# Patient Record
Sex: MALE | Race: ASIAN | NOT HISPANIC OR LATINO
[De-identification: names, ages, dates, MRNs, and addresses within clinical notes are randomized per-mention and may not be internally consistent; named-entity substitution may affect disease eponyms.]

---

## 2022-09-07 ENCOUNTER — NON-APPOINTMENT (OUTPATIENT)
Age: 72
End: 2022-09-07

## 2022-09-12 PROBLEM — Z00.00 ENCOUNTER FOR PREVENTIVE HEALTH EXAMINATION: Status: ACTIVE | Noted: 2022-09-12

## 2022-09-15 ENCOUNTER — NON-APPOINTMENT (OUTPATIENT)
Age: 72
End: 2022-09-15

## 2022-09-15 ENCOUNTER — APPOINTMENT (OUTPATIENT)
Dept: OPHTHALMOLOGY | Facility: CLINIC | Age: 72
End: 2022-09-15

## 2022-09-15 PROCEDURE — 92004 COMPRE OPH EXAM NEW PT 1/>: CPT

## 2022-09-22 ENCOUNTER — NON-APPOINTMENT (OUTPATIENT)
Age: 72
End: 2022-09-22

## 2022-09-22 ENCOUNTER — APPOINTMENT (OUTPATIENT)
Dept: OPHTHALMOLOGY | Facility: CLINIC | Age: 72
End: 2022-09-22

## 2022-09-22 PROCEDURE — 92012 INTRM OPH EXAM EST PATIENT: CPT

## 2022-09-22 PROCEDURE — 92136 OPHTHALMIC BIOMETRY: CPT

## 2022-10-05 ENCOUNTER — APPOINTMENT (OUTPATIENT)
Dept: OPHTHALMOLOGY | Facility: EYE CENTER | Age: 72
End: 2022-10-05

## 2022-10-06 ENCOUNTER — APPOINTMENT (OUTPATIENT)
Dept: OPHTHALMOLOGY | Facility: CLINIC | Age: 72
End: 2022-10-06

## 2023-03-04 ENCOUNTER — INPATIENT (INPATIENT)
Facility: HOSPITAL | Age: 73
LOS: 1 days | Discharge: HOME | DRG: 69 | End: 2023-03-06
Attending: INTERNAL MEDICINE | Admitting: INTERNAL MEDICINE
Payer: COMMERCIAL

## 2023-03-04 VITALS
RESPIRATION RATE: 17 BRPM | HEIGHT: 68 IN | TEMPERATURE: 98 F | SYSTOLIC BLOOD PRESSURE: 162 MMHG | OXYGEN SATURATION: 96 % | DIASTOLIC BLOOD PRESSURE: 90 MMHG | HEART RATE: 64 BPM | WEIGHT: 173.94 LBS

## 2023-03-04 DIAGNOSIS — Z29.9 ENCOUNTER FOR PROPHYLACTIC MEASURES, UNSPECIFIED: ICD-10-CM

## 2023-03-04 DIAGNOSIS — E11.9 TYPE 2 DIABETES MELLITUS WITHOUT COMPLICATIONS: ICD-10-CM

## 2023-03-04 DIAGNOSIS — G45.9 TRANSIENT CEREBRAL ISCHEMIC ATTACK, UNSPECIFIED: ICD-10-CM

## 2023-03-04 LAB
ALBUMIN SERPL ELPH-MCNC: 3.7 G/DL — SIGNIFICANT CHANGE UP (ref 3.5–5)
ALP SERPL-CCNC: 97 U/L — SIGNIFICANT CHANGE UP (ref 40–120)
ALT FLD-CCNC: 21 U/L DA — SIGNIFICANT CHANGE UP (ref 10–60)
ANION GAP SERPL CALC-SCNC: 0 MMOL/L — LOW (ref 5–17)
ANISOCYTOSIS BLD QL: SLIGHT — SIGNIFICANT CHANGE UP
APTT BLD: 30 SEC — SIGNIFICANT CHANGE UP (ref 27.5–35.5)
AST SERPL-CCNC: 35 U/L — SIGNIFICANT CHANGE UP (ref 10–40)
BASOPHILS # BLD AUTO: 0.02 K/UL — SIGNIFICANT CHANGE UP (ref 0–0.2)
BASOPHILS NFR BLD AUTO: 0.3 % — SIGNIFICANT CHANGE UP (ref 0–2)
BILIRUB SERPL-MCNC: 0.9 MG/DL — SIGNIFICANT CHANGE UP (ref 0.2–1.2)
BUN SERPL-MCNC: 9 MG/DL — SIGNIFICANT CHANGE UP (ref 7–18)
CALCIUM SERPL-MCNC: 9.7 MG/DL — SIGNIFICANT CHANGE UP (ref 8.4–10.5)
CHLORIDE SERPL-SCNC: 103 MMOL/L — SIGNIFICANT CHANGE UP (ref 96–108)
CO2 SERPL-SCNC: 31 MMOL/L — SIGNIFICANT CHANGE UP (ref 22–31)
CREAT SERPL-MCNC: 0.74 MG/DL — SIGNIFICANT CHANGE UP (ref 0.5–1.3)
EGFR: 97 ML/MIN/1.73M2 — SIGNIFICANT CHANGE UP
EOSINOPHIL # BLD AUTO: 0.32 K/UL — SIGNIFICANT CHANGE UP (ref 0–0.5)
EOSINOPHIL NFR BLD AUTO: 4.4 % — SIGNIFICANT CHANGE UP (ref 0–6)
GLUCOSE BLDC GLUCOMTR-MCNC: 156 MG/DL — HIGH (ref 70–99)
GLUCOSE SERPL-MCNC: 110 MG/DL — HIGH (ref 70–99)
HCT VFR BLD CALC: 44.1 % — SIGNIFICANT CHANGE UP (ref 39–50)
HGB BLD-MCNC: 13.7 G/DL — SIGNIFICANT CHANGE UP (ref 13–17)
HYPOCHROMIA BLD QL: SLIGHT — SIGNIFICANT CHANGE UP
IMM GRANULOCYTES NFR BLD AUTO: 0.8 % — SIGNIFICANT CHANGE UP (ref 0–0.9)
INR BLD: 1.21 RATIO — HIGH (ref 0.88–1.16)
LYMPHOCYTES # BLD AUTO: 2.41 K/UL — SIGNIFICANT CHANGE UP (ref 1–3.3)
LYMPHOCYTES # BLD AUTO: 33 % — SIGNIFICANT CHANGE UP (ref 13–44)
MANUAL SMEAR VERIFICATION: SIGNIFICANT CHANGE UP
MCHC RBC-ENTMCNC: 20.5 PG — LOW (ref 27–34)
MCHC RBC-ENTMCNC: 31.1 GM/DL — LOW (ref 32–36)
MCV RBC AUTO: 66.1 FL — LOW (ref 80–100)
MICROCYTES BLD QL: SLIGHT — SIGNIFICANT CHANGE UP
MONOCYTES # BLD AUTO: 0.6 K/UL — SIGNIFICANT CHANGE UP (ref 0–0.9)
MONOCYTES NFR BLD AUTO: 8.2 % — SIGNIFICANT CHANGE UP (ref 2–14)
NEUTROPHILS # BLD AUTO: 3.89 K/UL — SIGNIFICANT CHANGE UP (ref 1.8–7.4)
NEUTROPHILS NFR BLD AUTO: 53.3 % — SIGNIFICANT CHANGE UP (ref 43–77)
NRBC # BLD: 0 /100 WBCS — SIGNIFICANT CHANGE UP (ref 0–0)
PLAT MORPH BLD: NORMAL — SIGNIFICANT CHANGE UP
PLATELET # BLD AUTO: 266 K/UL — SIGNIFICANT CHANGE UP (ref 150–400)
POTASSIUM SERPL-MCNC: 5.1 MMOL/L — SIGNIFICANT CHANGE UP (ref 3.5–5.3)
POTASSIUM SERPL-SCNC: 5.1 MMOL/L — SIGNIFICANT CHANGE UP (ref 3.5–5.3)
PROT SERPL-MCNC: 7.9 G/DL — SIGNIFICANT CHANGE UP (ref 6–8.3)
PROTHROM AB SERPL-ACNC: 14.4 SEC — HIGH (ref 10.5–13.4)
RBC # BLD: 6.67 M/UL — HIGH (ref 4.2–5.8)
RBC # FLD: 17.8 % — HIGH (ref 10.3–14.5)
RBC BLD AUTO: ABNORMAL
SARS-COV-2 RNA SPEC QL NAA+PROBE: SIGNIFICANT CHANGE UP
SODIUM SERPL-SCNC: 134 MMOL/L — LOW (ref 135–145)
SPHEROCYTES BLD QL SMEAR: SLIGHT — SIGNIFICANT CHANGE UP
TARGETS BLD QL SMEAR: SLIGHT — SIGNIFICANT CHANGE UP
TROPONIN I, HIGH SENSITIVITY RESULT: 4.8 NG/L — SIGNIFICANT CHANGE UP
WBC # BLD: 7.3 K/UL — SIGNIFICANT CHANGE UP (ref 3.8–10.5)
WBC # FLD AUTO: 7.3 K/UL — SIGNIFICANT CHANGE UP (ref 3.8–10.5)

## 2023-03-04 PROCEDURE — 99291 CRITICAL CARE FIRST HOUR: CPT

## 2023-03-04 PROCEDURE — 70498 CT ANGIOGRAPHY NECK: CPT | Mod: 26,MA

## 2023-03-04 PROCEDURE — 70496 CT ANGIOGRAPHY HEAD: CPT | Mod: 26,MA

## 2023-03-04 PROCEDURE — 0042T: CPT | Mod: MA

## 2023-03-04 PROCEDURE — 93010 ELECTROCARDIOGRAM REPORT: CPT

## 2023-03-04 RX ORDER — ATORVASTATIN CALCIUM 80 MG/1
80 TABLET, FILM COATED ORAL AT BEDTIME
Refills: 0 | Status: DISCONTINUED | OUTPATIENT
Start: 2023-03-04 | End: 2023-03-06

## 2023-03-04 RX ORDER — ENOXAPARIN SODIUM 100 MG/ML
40 INJECTION SUBCUTANEOUS EVERY 24 HOURS
Refills: 0 | Status: DISCONTINUED | OUTPATIENT
Start: 2023-03-04 | End: 2023-03-06

## 2023-03-04 RX ORDER — ASPIRIN/CALCIUM CARB/MAGNESIUM 324 MG
81 TABLET ORAL DAILY
Refills: 0 | Status: DISCONTINUED | OUTPATIENT
Start: 2023-03-04 | End: 2023-03-06

## 2023-03-04 RX ORDER — ACETAMINOPHEN 500 MG
650 TABLET ORAL EVERY 6 HOURS
Refills: 0 | Status: DISCONTINUED | OUTPATIENT
Start: 2023-03-04 | End: 2023-03-06

## 2023-03-04 RX ORDER — INSULIN LISPRO 100/ML
VIAL (ML) SUBCUTANEOUS
Refills: 0 | Status: DISCONTINUED | OUTPATIENT
Start: 2023-03-04 | End: 2023-03-06

## 2023-03-04 RX ORDER — LOSARTAN POTASSIUM 100 MG/1
0 TABLET, FILM COATED ORAL
Qty: 0 | Refills: 0 | DISCHARGE

## 2023-03-04 RX ORDER — INSULIN LISPRO 100/ML
VIAL (ML) SUBCUTANEOUS AT BEDTIME
Refills: 0 | Status: DISCONTINUED | OUTPATIENT
Start: 2023-03-04 | End: 2023-03-06

## 2023-03-04 RX ORDER — ROSUVASTATIN CALCIUM 5 MG/1
0 TABLET ORAL
Qty: 0 | Refills: 0 | DISCHARGE

## 2023-03-04 RX ORDER — LANOLIN ALCOHOL/MO/W.PET/CERES
3 CREAM (GRAM) TOPICAL AT BEDTIME
Refills: 0 | Status: DISCONTINUED | OUTPATIENT
Start: 2023-03-04 | End: 2023-03-06

## 2023-03-04 RX ADMIN — ATORVASTATIN CALCIUM 80 MILLIGRAM(S): 80 TABLET, FILM COATED ORAL at 22:50

## 2023-03-04 NOTE — ED PROVIDER NOTE - INTERPRETATION
General Sunscreen Counseling: I recommended a broad spectrum sunscreen with a SPF of 30 or higher and the importance of sun protective clothing. Detail Level: Detailed abnormal

## 2023-03-04 NOTE — H&P ADULT - PROBLEM SELECTOR PLAN 1
pt p/w slurred speech, now resolved  NIHSS 0 on admission   CTH as above, no acute infarct   pt passed dysphagia screen in ED  admit to tele  started on aspirin and high dose statin  f/u A1c and lipid panel  f/u echo  Pt at baseline, does not need PT eval  Consider MRI per Neuro  Neuro Robbins consulted

## 2023-03-04 NOTE — ED ADULT NURSE NOTE - NSIMPLEMENTINTERV_GEN_ALL_ED
Implemented All Universal Safety Interventions:  [facility] to call system. Call bell, personal items and telephone within reach. Instruct patient to call for assistance. Room bathroom lighting operational. Non-slip footwear when patient is off stretcher. Physically safe environment: no spills, clutter or unnecessary equipment. Stretcher in lowest position, wheels locked, appropriate side rails in place.

## 2023-03-04 NOTE — ED ADULT NURSE NOTE - OBJECTIVE STATEMENT
As per pt. c/o slurred speech about 2 hrs ago which has since gotten better. Pt s/o of pulling feeling at the back of head. Denies pain and all other symptoms

## 2023-03-04 NOTE — ED PROVIDER NOTE - CLINICAL SUMMARY MEDICAL DECISION MAKING FREE TEXT BOX
72-year-old male hx of prior TIA, on ASA/Plavix, DM on metformin, presenting with slurred speech earlier today. NIHSS 0. CTH/CTA/perfusion normal. WIll admit to [facility] for stroke wrokup.

## 2023-03-04 NOTE — ED PROVIDER NOTE - OBJECTIVE STATEMENT
72-year-old male hx of prior TIA, on ASA/Plavix, DM on metformin, presenting with slurred speech earlier today. LKN 3pm. By the time he arrived in the ER his symptoms resolved. No focal weakness, numbness, tingling, difficulty walking, dizziness, or any other neurological symptoms. No other symptoms.

## 2023-03-04 NOTE — H&P ADULT - HISTORY OF PRESENT ILLNESS
72 year old male with PMHx TIA and DM presents with slurred speech since this afternoon. Pt states that he woke up more fatigued than usual this morning, however at 3pm, pt began slurring his speech. He states he was talking to his wife and noticed that it was hard for him to lift his tongue and pronounce certain letters. His wife told him that his face looked "swollen" but he had no facial droop at the time. Pt denies any other focal deficits at the time including trouble swallowing vision difficulty, or focal extremity weakness. He states that symptoms resolved at around 530pm. Pt denies any fall, head trauma or LOC. Also denies any recent fever/chills, headache, dizziness, chest pain, palpitations, cough, SOB, n/v/d/c, dysuria or leg swelling. Pt was on plavix for 40 days after his previous TIA and now takes aspirin only, metformin for DM. Was prescribed statin and losartan per surescripts but denies HTN/HLD or taking those meds. MEMO

## 2023-03-04 NOTE — H&P ADULT - NSHPPHYSICALEXAM_GEN_ALL_CORE
T(C): 36.4 (03-04-23 @ 18:45), Max: 36.8 (03-04-23 @ 17:42)  HR: 66 (03-04-23 @ 18:45) (64 - 66)  BP: 158/90 (03-04-23 @ 18:45) (158/90 - 162/90)  RR: 18 (03-04-23 @ 18:45) (17 - 18)  SpO2: 96% (03-04-23 @ 18:45) (96% - 96%)    GENERAL: patient appears well, NAD, pleasant  HEAD: normocephalic, atraumatic  EYES: sclera clear, no exudates  ENMT: oropharynx clear without erythema, no exudates, moist mucous membranes  NECK: supple, soft, no thyromegaly noted  LUNGS: clear to auscultation bilaterally, no wheezing, rhonchi or rales appreciated  HEART: S1/S2, regular rate and rhythm, no murmurs noted, no lower extremity edema  GASTROINTESTINAL: abdomen is soft, nondistended, nontender, normoactive bowel sounds, no palpable masses  INTEGUMENT: good skin turgor, no lesions noted  MUSCULOSKELETAL: no clubbing or cyanosis, no obvious deformity  NEUROLOGIC: AAO x3, CNII-XII intact, no obvious sensorimotor deficits noted

## 2023-03-05 LAB
A1C WITH ESTIMATED AVERAGE GLUCOSE RESULT: 6.7 % — HIGH (ref 4–5.6)
ANION GAP SERPL CALC-SCNC: 7 MMOL/L — SIGNIFICANT CHANGE UP (ref 5–17)
BUN SERPL-MCNC: 10 MG/DL — SIGNIFICANT CHANGE UP (ref 7–18)
CALCIUM SERPL-MCNC: 9.2 MG/DL — SIGNIFICANT CHANGE UP (ref 8.4–10.5)
CHLORIDE SERPL-SCNC: 105 MMOL/L — SIGNIFICANT CHANGE UP (ref 96–108)
CHOLEST SERPL-MCNC: 188 MG/DL — SIGNIFICANT CHANGE UP
CO2 SERPL-SCNC: 27 MMOL/L — SIGNIFICANT CHANGE UP (ref 22–31)
CREAT SERPL-MCNC: 0.71 MG/DL — SIGNIFICANT CHANGE UP (ref 0.5–1.3)
EGFR: 99 ML/MIN/1.73M2 — SIGNIFICANT CHANGE UP
ESTIMATED AVERAGE GLUCOSE: 146 MG/DL — HIGH (ref 68–114)
GLUCOSE BLDC GLUCOMTR-MCNC: 103 MG/DL — HIGH (ref 70–99)
GLUCOSE BLDC GLUCOMTR-MCNC: 119 MG/DL — HIGH (ref 70–99)
GLUCOSE BLDC GLUCOMTR-MCNC: 132 MG/DL — HIGH (ref 70–99)
GLUCOSE BLDC GLUCOMTR-MCNC: 174 MG/DL — HIGH (ref 70–99)
GLUCOSE SERPL-MCNC: 121 MG/DL — HIGH (ref 70–99)
HCT VFR BLD CALC: 39.7 % — SIGNIFICANT CHANGE UP (ref 39–50)
HCV AB S/CO SERPL IA: 0.06 S/CO — SIGNIFICANT CHANGE UP (ref 0–0.99)
HCV AB SERPL-IMP: SIGNIFICANT CHANGE UP
HDLC SERPL-MCNC: 29 MG/DL — LOW
HGB BLD-MCNC: 12.5 G/DL — LOW (ref 13–17)
LIPID PNL WITH DIRECT LDL SERPL: 88 MG/DL — SIGNIFICANT CHANGE UP
MAGNESIUM SERPL-MCNC: 1.9 MG/DL — SIGNIFICANT CHANGE UP (ref 1.6–2.6)
MCHC RBC-ENTMCNC: 20.4 PG — LOW (ref 27–34)
MCHC RBC-ENTMCNC: 31.5 GM/DL — LOW (ref 32–36)
MCV RBC AUTO: 64.7 FL — LOW (ref 80–100)
NON HDL CHOLESTEROL: 159 MG/DL — HIGH
NRBC # BLD: 0 /100 WBCS — SIGNIFICANT CHANGE UP (ref 0–0)
PHOSPHATE SERPL-MCNC: 2.3 MG/DL — LOW (ref 2.5–4.5)
PLATELET # BLD AUTO: 251 K/UL — SIGNIFICANT CHANGE UP (ref 150–400)
POTASSIUM SERPL-MCNC: 4.1 MMOL/L — SIGNIFICANT CHANGE UP (ref 3.5–5.3)
POTASSIUM SERPL-SCNC: 4.1 MMOL/L — SIGNIFICANT CHANGE UP (ref 3.5–5.3)
RBC # BLD: 6.14 M/UL — HIGH (ref 4.2–5.8)
RBC # FLD: 17.4 % — HIGH (ref 10.3–14.5)
SODIUM SERPL-SCNC: 139 MMOL/L — SIGNIFICANT CHANGE UP (ref 135–145)
TRIGL SERPL-MCNC: 355 MG/DL — HIGH
WBC # BLD: 8.71 K/UL — SIGNIFICANT CHANGE UP (ref 3.8–10.5)
WBC # FLD AUTO: 8.71 K/UL — SIGNIFICANT CHANGE UP (ref 3.8–10.5)

## 2023-03-05 PROCEDURE — 71046 X-RAY EXAM CHEST 2 VIEWS: CPT | Mod: 26

## 2023-03-05 PROCEDURE — 99223 1ST HOSP IP/OBS HIGH 75: CPT

## 2023-03-05 RX ORDER — ALBUTEROL 90 UG/1
2.5 AEROSOL, METERED ORAL EVERY 6 HOURS
Refills: 0 | Status: DISCONTINUED | OUTPATIENT
Start: 2023-03-05 | End: 2023-03-05

## 2023-03-05 RX ORDER — ALBUTEROL 90 UG/1
2 AEROSOL, METERED ORAL EVERY 6 HOURS
Refills: 0 | Status: DISCONTINUED | OUTPATIENT
Start: 2023-03-05 | End: 2023-03-06

## 2023-03-05 RX ADMIN — Medication 200 MILLIGRAM(S): at 18:53

## 2023-03-05 RX ADMIN — ALBUTEROL 2.5 MILLIGRAM(S): 90 AEROSOL, METERED ORAL at 14:54

## 2023-03-05 RX ADMIN — ENOXAPARIN SODIUM 40 MILLIGRAM(S): 100 INJECTION SUBCUTANEOUS at 05:56

## 2023-03-05 RX ADMIN — Medication 81 MILLIGRAM(S): at 12:09

## 2023-03-05 RX ADMIN — Medication 200 MILLIGRAM(S): at 15:31

## 2023-03-05 RX ADMIN — ALBUTEROL 2 PUFF(S): 90 AEROSOL, METERED ORAL at 22:07

## 2023-03-05 RX ADMIN — ATORVASTATIN CALCIUM 80 MILLIGRAM(S): 80 TABLET, FILM COATED ORAL at 22:08

## 2023-03-05 NOTE — CONSULT NOTE ADULT - SUBJECTIVE AND OBJECTIVE BOX
PATIENT SEEN AND EXAMINED ON :- 3/5/23  DATE OF SERVICE:   3/5/23          Interim events noted,Labs ,Radiological studies and Cardiology tests reviewed .    MR#[id number]  PATIENT NAME:-[facility] COURSE: HPI:  72 year old male with PMHx TIA and DM presents with slurred speech since this afternoon. Pt states that he woke up more fatigued than usual this morning, however at 3pm, pt began slurring his speech. He states he was talking to his wife and noticed that it was hard for him to lift his tongue and pronounce certain letters. His wife told him that his face looked "swollen" but he had no facial droop at the time. Pt denies any other focal deficits at the time including trouble swallowing vision difficulty, or focal extremity weakness. He states that symptoms resolved at around 530pm. Pt denies any fall, head trauma or LOC. Also denies any recent fever/chills, headache, dizziness, chest pain, palpitations, cough, SOB, n/v/d/c, dysuria or leg swelling. Pt was on plavix for 40 days after his previous TIA and now takes aspirin only, metformin for DM. Was prescribed statin and losartan per surescripts but denies HTN/HLD or taking those meds. NDKA  (04 Mar 2023 20:30)      INTERIM EVENTS:Patient seen at bedside ,interim events noted.      [facility] -reviewed admission note, no change since admission  HEART FAILURE: Acute[ ]Chronic[ ] Systolic[ ] Diastolic[ ] Combined Systolic and Diastolic[ ]  CAD[ ] CABG[ ] PCI[ ]  DEVICES[ ] PPM[ ] ICD[ ] ILR[ ]  ATRIAL FIBRILLATION[ ] Paroxysmal[ ] Permanent[ ] CHADS2-[  ]  ALEJANDRO[ ] CKD1[ ] CKD2[ ] CKD3[ ] CKD4[ ] ESRD[ ]  COPD[ ] HTN[ ]   DM[ ] Type1[ ] Type 2[ ]   CVA[ ] Paresis[ ]    AMBULATION: Assisted[ ] Cane/walker[ ] Independent[ ]    MEDICATIONS  (STANDING):  albuterol    90 MICROgram(s) HFA Inhaler 2 Puff(s) Inhalation every 6 hours  aspirin enteric coated 81 milliGRAM(s) Oral daily  atorvastatin 80 milliGRAM(s) Oral at bedtime  enoxaparin Injectable 40 milliGRAM(s) SubCutaneous every 24 hours  guaiFENesin Oral Liquid (Sugar-Free) 200 milliGRAM(s) Oral every 6 hours  insulin lispro (ADMELOG) corrective regimen sliding scale   SubCutaneous three times a day before meals  insulin lispro (ADMELOG) corrective regimen sliding scale   SubCutaneous at bedtime    MEDICATIONS  (PRN):  acetaminophen     Tablet .. 650 milliGRAM(s) Oral every 6 hours PRN Temp greater or equal to 38C (100.4F), Mild Pain (1 - 3)  melatonin 3 milliGRAM(s) Oral at bedtime PRN Insomnia            REVIEW OF SYSTEMS:  Constitutional: [ ] fever, [ ]weight loss,  [ ]fatigue [ ]weight gain  Eyes: [ ] visual changes  Respiratory: [ ]shortness of breath;  [ ] cough, [ ]wheezing, [ ]chills, [ ]hemoptysis  Cardiovascular: [ ] chest pain, [ ]palpitations, [ ]dizziness,  [ ]leg swelling[ ]orthopnea[ ]PND  Gastrointestinal: [ ] abdominal pain, [ ]nausea, [ ]vomiting,  [ ]diarrhea [ ]Constipation [ ]Melena  Genitourinary: [ ] dysuria, [ ] hematuria [ ]Wiley  Neurologic: [ ] headaches [ ] tremors[ ]weakness [ ]Paralysis Right[ ] Left[ ]  Skin: [ ] itching, [ ]burning, [ ] rashes  Endocrine: [ ] heat or cold intolerance  Musculoskeletal: [ ] joint pain or swelling; [ ] muscle, back, or extremity pain  Psychiatric: [ ] depression, [ ]anxiety, [ ]mood swings, or [ ]difficulty sleeping  Hematologic: [ ] easy bruising, [ ] bleeding gums    [ ] All remaining systems negative except as per above.   [ ]Unable to obtain.  [x] No change in ROS since admission      Vital Signs Last 24 Hrs  T(C): 36.9 (05 Mar 2023 15:42), Max: 36.9 (05 Mar 2023 15:42)  T(F): 98.4 (05 Mar 2023 15:42), Max: 98.4 (05 Mar 2023 15:42)  HR: 73 (05 Mar 2023 15:42) (58 - 79)  BP: 128/83 (05 Mar 2023 15:42) (128/83 - 149/81)  BP(mean): --  RR: 18 (05 Mar 2023 15:42) (17 - 18)  SpO2: 95% (05 Mar 2023 15:42) (95% - 98%)    Parameters below as of 05 Mar 2023 15:42  Patient On (Oxygen Delivery Method): room air      I&O's Summary      PHYSICAL EXAM:  General: No acute distress BMI-  HEENT: EOMI, PERRL  Neck: Supple, [ ] JVD  Lungs: Equal air entry bilaterally; [ ] rales [ ] wheezing [ ] rhonchi  Heart: Regular rate and rhythm; [x ] murmur   2/6 [ x] systolic [ ] diastolic [ ] radiation[ ] rubs [ ]  gallops  Abdomen: Nontender, bowel sounds present  Extremities: No clubbing, cyanosis, [ ] edema [ ]Pulses  equal and intact  Nervous system:  Alert & Oriented X3, no focal deficits  Psychiatric: Normal affect  Skin: No rashes or lesions    LABS:  03-05    139  |  105  |  10  ----------------------------<  121<H>  4.1   |  27  |  0.71    Ca    9.2      05 Mar 2023 06:58  Phos  2.3     03-05  Mg     1.9     03-05    TPro  7.9  /  Alb  3.7  /  TBili  0.9  /  DBili  x   /  AST  35  /  ALT  21  /  AlkPhos  97  03-04    Creatinine Trend: 0.71<--, 0.74<--                        12.5   8.71  )-----------( 251      ( 05 Mar 2023 06:58 )             39.7     PT/INR - ( 04 Mar 2023 18:20 )   PT: 14.4 sec;   INR: 1.21 ratio         PTT - ( 04 Mar 2023 18:20 )  PTT:30.0 sec

## 2023-03-05 NOTE — CONSULT NOTE ADULT - TIME BILLING
I counseled the primary team about the testing indicated to complete the TIA/stroke workup, as well as the medications to maintain for stroke prevention.
- Review of records, telemetry, vital signs and daily labs.   - General and cardiovascular physical examination.  - Generation of cardiovascular treatment plan.  - Coordination of care.      Patient was seen and examined by me on 3/5/23,interim events noted,labs and radiology studies reviewed.  Mino Fournier MD,FACC.  [address] [place], [place] [id number].  [phone]

## 2023-03-05 NOTE — PROGRESS NOTE ADULT - PROBLEM SELECTOR PLAN 1
pt p/w slurred speech, now resolved  NIHSS 0 on admission   CTH as above, no acute infarct   pt passed dysphagia screen in ED   on aspirin and high dose statin    albuterol nebs for wheezing, pt not hypoxic

## 2023-03-05 NOTE — CONSULT NOTE ADULT - ASSESSMENT
72M with PMHx TIA and DM p/w slurred speech. Adm to telemetry for TIA w/u     Problem/Plan - 1:  ·  Problem: TIA (transient ischemic attack).   ·  Plan: pt p/w slurred speech, now resolved  NIHSS 0 on admission   CTH as above, no acute infarct   pt passed dysphagia screen in ED  admit to tele  started on aspirin and high dose statin  f/u A1c and lipid panel  f/u echo  Pt at baseline, does not need PT eval  Consider MRI per Neuro  Neuro consult     Problem/Plan - 2:  ·  Problem: DM (diabetes mellitus).   ·  Plan: on metformin  f/u A1c  c/w SSI  FS ACHS.

## 2023-03-05 NOTE — PROGRESS NOTE ADULT - SUBJECTIVE AND OBJECTIVE BOX
SUBJECTIVE / OVERNIGHT EVENTS:pt seen and examined  03-05-23    MEDICATIONS  (STANDING):  albuterol    90 MICROgram(s) HFA Inhaler 2 Puff(s) Inhalation every 6 hours  aspirin enteric coated 81 milliGRAM(s) Oral daily  atorvastatin 80 milliGRAM(s) Oral at bedtime  enoxaparin Injectable 40 milliGRAM(s) SubCutaneous every 24 hours  guaiFENesin Oral Liquid (Sugar-Free) 200 milliGRAM(s) Oral every 6 hours  insulin lispro (ADMELOG) corrective regimen sliding scale   SubCutaneous three times a day before meals  insulin lispro (ADMELOG) corrective regimen sliding scale   SubCutaneous at bedtime    MEDICATIONS  (PRN):  acetaminophen     Tablet .. 650 milliGRAM(s) Oral every 6 hours PRN Temp greater or equal to 38C (100.4F), Mild Pain (1 - 3)  melatonin 3 milliGRAM(s) Oral at bedtime PRN Insomnia    T(C): 36.8 (03-05-23 @ 20:00), Max: 36.9 (03-05-23 @ 15:42)  HR: 79 (03-05-23 @ 22:14) (58 - 96)  BP: 138/87 (03-05-23 @ 22:14) (128/83 - 161/89)  RR: 17 (03-05-23 @ 20:00) (17 - 18)  SpO2: 98% (03-05-23 @ 20:00) (95% - 98%)    CAPILLARY BLOOD GLUCOSE      POCT Blood Glucose.: 174 mg/dL (05 Mar 2023 20:48)  POCT Blood Glucose.: 119 mg/dL (05 Mar 2023 16:37)  POCT Blood Glucose.: 132 mg/dL (05 Mar 2023 11:36)  POCT Blood Glucose.: 103 mg/dL (05 Mar 2023 07:23)  POCT Blood Glucose.: 156 mg/dL (04 Mar 2023 22:49)    I&O's Summary      Constitutional: No fever, fatigue  Skin: No rash.  Eyes: No recent vision problems or eye pain.  ENT: No congestion, ear pain, or sore throat.  Cardiovascular: No chest pain or palpation.  Respiratory: No cough, shortness of breath, congestion, or wheezing.  Gastrointestinal: No abdominal pain, nausea, vomiting, or diarrhea.  Genitourinary: No dysuria.  Musculoskeletal: No joint swelling.  Neurologic: No headache.    PHYSICAL EXAM:  GENERAL: NAD  EYES: EOMI, PERRLA  NECK: Supple, No JVD  CHEST/LUNG: exp wheeze+  HEART:  S1 , S2 +  ABDOMEN: soft , bs+  EXTREMITIES:  no edema  NEUROLOGY:alert awake oriented       LABS:                        12.5   8.71  )-----------( 251      ( 05 Mar 2023 06:58 )             39.7     03-05    139  |  105  |  10  ----------------------------<  121<H>  4.1   |  27  |  0.71    Ca    9.2      05 Mar 2023 06:58  Phos  2.3     03-05  Mg     1.9     03-05    TPro  7.9  /  Alb  3.7  /  TBili  0.9  /  DBili  x   /  AST  35  /  ALT  21  /  AlkPhos  97  03-04    PT/INR - ( 04 Mar 2023 18:20 )   PT: 14.4 sec;   INR: 1.21 ratio         PTT - ( 04 Mar 2023 18:20 )  PTT:30.0 sec          RADIOLOGY & ADDITIONAL TESTS:    Imaging Personally Reviewed:yes    Consultant(s) Notes Reviewed:  yes    Care Discussed with Consultants/Other Providers:yes

## 2023-03-05 NOTE — CONSULT NOTE ADULT - SUBJECTIVE AND OBJECTIVE BOX
NEUROLOGY CONSULT NOTE    NAME:  POLINA GREER      ASSESSMENT:  72 RHM with history of transient ischemic attack and new episode of transient slurred speech, likely secondary to transient ischemic attack and less likely to represent adverse effect of Benzonatate      RECOMMENDATIONS:      1. Stroke workup  - Transthoracic Echocardiogram  - Telemetry monitoring while inpatient  - MRI Brain not needed given normal neurological exam at present      2. Secondary stroke prevention  - Q4H Neurochecks & Vital signs  - Confirm that patient has passed bedside swallow evaluation  - Continue home Aspirin 81mg PO Daily (or Aspirin 300mg SC daily if NPO)  - Start Atorvastatin 40mg PO QHS (goal LDL < 70 mg/dL)  - Treat BP if over 140/90 (goal /80)       - Maintain home antihypertensive medications       - No role for permissive hypertension given resolved symptoms  - Diabetes management as per primary team and Endocrinology consult if needed  - PT/OT  - DVT ppx: SCDs, Enoxaparin      3. Recommend patient follow in Stroke clinic for close follow-up with Krysta Nevarez NP or Davina Maciel NP at [address] [place] [address] - [phone]. Patient can then follow routinely at Stroke clinic in [place] or General Neurology clinic in [place] for further appointments.          NOTE TO BE COMPLETED - PLEASE REFER TO ABOVE ONLY AND IGNORE INFORMATION BELOW    *******************************      CHIEF COMPLAINT:  Patient is a 72y old  Male who presents with a chief complaint of TIA (04 Mar 2023 20:30)      HPI:  72 year old male with PMHx TIA and DM presents with slurred speech since this afternoon. Pt states that he woke up more fatigued than usual this morning, however at 3pm, pt began slurring his speech. He states he was talking to his wife and noticed that it was hard for him to lift his tongue and pronounce certain letters. His wife told him that his face looked "swollen" but he had no facial droop at the time. Pt denies any other focal deficits at the time including trouble swallowing vision difficulty, or focal extremity weakness. He states that symptoms resolved at around 530pm. Pt denies any fall, head trauma or LOC. Also denies any recent fever/chills, headache, dizziness, chest pain, palpitations, cough, SOB, n/v/d/c, dysuria or leg swelling. Pt was on plavix for 40 days after his previous TIA and now takes aspirin only, metformin for DM. Was prescribed statin and losartan per surescripts but denies HTN/HLD or taking those meds. NDKA  (04 Mar 2023 20:30)      NEURO HPI:      PAST MEDICAL & SURGICAL HISTORY:  TIA (transient ischemic attack)  DM (diabetes mellitus)      MEDICATIONS:  acetaminophen     Tablet .. 650 milliGRAM(s) Oral every 6 hours PRN  albuterol    0.083% 2.5 milliGRAM(s) Nebulizer every 6 hours  aspirin enteric coated 81 milliGRAM(s) Oral daily  atorvastatin 80 milliGRAM(s) Oral at bedtime  benzonatate 100 milliGRAM(s) Oral three times a day  enoxaparin Injectable 40 milliGRAM(s) SubCutaneous every 24 hours  insulin lispro (ADMELOG) corrective regimen sliding scale   SubCutaneous three times a day before meals  insulin lispro (ADMELOG) corrective regimen sliding scale   SubCutaneous at bedtime  melatonin 3 milliGRAM(s) Oral at bedtime PRN      ALLERGIES:  No Known Allergies      FAMILY HISTORY:        SOCIAL HISTORY:  Denies alcohol, tobacco, or illicit drug use      REVIEW OF SYSTEMS:  GENERAL: No fever, weight changes, fatigue  EYES: No eye pain or discharge  EAR/NOSE/MOUTH/THROAT: No sinus or throat pain; No difficulty hearing  NECK: No pain or stiffness  RESPIRATORY: No cough, wheezing, chills, or hemoptysis  CARDIOVASCULAR: No chest pain, palpitations, shortness of breath, or dyspnea on exertion  GASTROINTESTINAL: No abdominal pain, nausea, vomiting, hematemesis, diarrhea, or constipation  GENITOURINARY: No dysuria, frequency, hematuria, or incontinence  SKIN: No rashes or lesions  ENDOCRINE: No heat or cold intolerance  HEMATOLOGIC: No easy bruising or bleeding  PSYCHIATRIC: No depression, anxiety, or mood swings  MUSCULOSKELETAL: No joint pain or swelling  NEUROLOGICAL: As per HPI          OBJECTIVE:    Vital Signs Last 24 Hrs  T(C): 36.4 (05 Mar 2023 11:29), Max: 36.8 (04 Mar 2023 17:42)  T(F): 97.5 (05 Mar 2023 11:29), Max: 98.2 (04 Mar 2023 17:42)  HR: 70 (05 Mar 2023 11:29) (58 - 79)  BP: 139/75 (05 Mar 2023 11:29) (132/89 - 162/90)  BP(mean): --  RR: 17 (05 Mar 2023 11:29) (17 - 18)  SpO2: 95% (05 Mar 2023 11:29) (95% - 98%)    Parameters below as of 05 Mar 2023 11:29  Patient On (Oxygen Delivery Method): room air        General Examination:  General: No acute distress  HEENT: Atraumatic, Normocephalic  Respiratory: CTA B/l.  No crackles, rhonchi, or wheezes.  Cardiovascular: RRR.  Normal S1 & S2.  Normal b/l radial and pedal pulses.    Neurological Examination:  General / Mental Status: AAO x 3.  No aphasia or dysarthria.  Naming and repetition intact.  Cranial Nerves: VFF x 4.  PERRL.  EOMI x 2, No nystagmus or diplopia.  B/l V1-V3 equal and intact to light touch and pinprick.  Symmetric facial movement and palate elevation.  B/l hearing equal to finger rub.  5/5 strength with b/l sternocleidomastoid & trapezius.  Midline tongue protrusion, with no atrophy or fasciculations.  Motor: Normal bulk & tone in all four extremities.  5/5 strength throughout all four extremities.  No downward drift, rigidity, spasticity, or tremors in any of the four extremities.  Sensory: Intact to light touch and pinprick in all four extremities.  Negative Romberg.  Reflex: 2+ and symmetric at b/l biceps, triceps, brachioradialis, patellae, and ankles.  Downgoing toes b/l.  Coordination: No dysmetria with b/l finger-to-nose and heel raise tests.  Symmetric rapid alternating movements b/l.  Gait: Normal, narrow-based gait.  No difficulty with tiptoe, heel, and tandem gaits.        LABORATORY VALUES:                        12.5   8.71  )-----------( 251      ( 05 Mar 2023 06:58 )             39.7       03-05    139  |  105  |  10  ----------------------------<  121<H>  4.1   |  27  |  0.71    Ca    9.2      05 Mar 2023 06:58  Phos  2.3     03-05  Mg     1.9     03-05    TPro  7.9  /  Alb  3.7  /  TBili  0.9  /  DBili  x   /  AST  35  /  ALT  21  /  AlkPhos  97  03-04 03-05 Chol 188 LDL 88 HDL 29<L> Trig 355<H>    A1C with Estimated Average Glucose (03.05.23 @ 06:58)   A1C with Estimated Average Glucose Result: 6.7%   Estimated Average Glucose: 146. mg/dL             NEUROIMAGING:          Please contact the Neurology consult service with any neurological questions.    Enrrique Robbins MD   of Neurology  [facility] NEUROLOGY CONSULT NOTE    NAME:  POLINA GREER      ASSESSMENT:  72 RHM with history of transient ischemic attack and new episode of transient slurred speech, likely secondary to transient ischemic attack and less likely to represent adverse effect of Benzonatate      RECOMMENDATIONS:      1. Stroke workup  - Transthoracic Echocardiogram  - Telemetry monitoring while inpatient  - MRI Brain not needed given normal neurological exam at present      2. Secondary stroke prevention  - Q4H Neurochecks & Vital signs  - Confirm that patient has passed bedside swallow evaluation  - Continue home Aspirin 81mg PO Daily (or Aspirin 300mg NC daily if NPO)  - Start Atorvastatin 40mg PO QHS (goal LDL < 70 mg/dL)  - Treat BP if over 140/90 (goal /80)       - Maintain home antihypertensive medications       - No role for permissive hypertension given resolved symptoms  - Diabetes management as per primary team and Endocrinology consult if needed  - PT/OT  - DVT ppx: SCDs, Enoxaparin      3. Recommend patient follow in Stroke clinic for close follow-up with Krysta Nevarez NP or Davina Maciel NP at [address] [place] [address] - [phone]. Patient can then follow routinely at Stroke clinic in [place] or General Neurology clinic in [place] for further appointments.          *******************************      CHIEF COMPLAINT:  Patient is a 72y old  Male who presents with a chief complaint of TIA (04 Mar 2023 20:30)      HPI:  72 year old male with PMHx TIA and DM presents with slurred speech since this afternoon. Pt states that he woke up more fatigued than usual this morning, however at 3pm, pt began slurring his speech. He states he was talking to his wife and noticed that it was hard for him to lift his tongue and pronounce certain letters. His wife told him that his face looked "swollen" but he had no facial droop at the time. Pt denies any other focal deficits at the time including trouble swallowing vision difficulty, or focal extremity weakness. He states that symptoms resolved at around 530pm. Pt denies any fall, head trauma or LOC. Also denies any recent fever/chills, headache, dizziness, chest pain, palpitations, cough, SOB, n/v/d/c, dysuria or leg swelling. Pt was on plavix for 40 days after his previous TIA and now takes aspirin only, metformin for DM. Was prescribed statin and losartan per Surescripts but denies HTN/HLD or taking those meds. NDKA  (04 Mar 2023 20:30)      NEURO HPI:  72 RHM with history of transient ischemic attack, presenting after experiencing transient slurred speech for 20 minutes. At present, he is at his baseline without any new neurological deficits.      PAST MEDICAL & SURGICAL HISTORY:  TIA (transient ischemic attack)  DM (diabetes mellitus)      MEDICATIONS:  acetaminophen     Tablet .. 650 milliGRAM(s) Oral every 6 hours PRN  albuterol    0.083% 2.5 milliGRAM(s) Nebulizer every 6 hours  aspirin enteric coated 81 milliGRAM(s) Oral daily  atorvastatin 80 milliGRAM(s) Oral at bedtime  benzonatate 100 milliGRAM(s) Oral three times a day  enoxaparin Injectable 40 milliGRAM(s) SubCutaneous every 24 hours  insulin lispro (ADMELOG) corrective regimen sliding scale   SubCutaneous three times a day before meals  insulin lispro (ADMELOG) corrective regimen sliding scale   SubCutaneous at bedtime  melatonin 3 milliGRAM(s) Oral at bedtime PRN      ALLERGIES:  No Known Allergies      FAMILY HISTORY:  Denies family history of stroke      SOCIAL HISTORY:  Denies alcohol, tobacco, or illicit drug use      REVIEW OF SYSTEMS:  GENERAL: No fever, weight changes, fatigue  EYES: No eye pain or discharge  EAR/NOSE/MOUTH/THROAT: No sinus or throat pain; No difficulty hearing  NECK: No pain or stiffness  RESPIRATORY: No cough, wheezing, chills, or hemoptysis  CARDIOVASCULAR: No chest pain, palpitations, shortness of breath, or dyspnea on exertion  GASTROINTESTINAL: No abdominal pain, nausea, vomiting, hematemesis, diarrhea, or constipation  GENITOURINARY: No dysuria, frequency, hematuria, or incontinence  SKIN: No rashes or lesions  ENDOCRINE: No heat or cold intolerance  HEMATOLOGIC: No easy bruising or bleeding  PSYCHIATRIC: No depression, anxiety, or mood swings  MUSCULOSKELETAL: No joint pain or swelling  NEUROLOGICAL: As per HPI          OBJECTIVE:    Vital Signs Last 24 Hrs  T(C): 36.4 (05 Mar 2023 11:29), Max: 36.8 (04 Mar 2023 17:42)  T(F): 97.5 (05 Mar 2023 11:29), Max: 98.2 (04 Mar 2023 17:42)  HR: 70 (05 Mar 2023 11:29) (58 - 79)  BP: 139/75 (05 Mar 2023 11:29) (132/89 - 162/90)  RR: 17 (05 Mar 2023 11:29) (17 - 18)  SpO2: 95% (05 Mar 2023 11:29) (95% - 98%)  Parameters below as of 05 Mar 2023 11:29  Patient On (Oxygen Delivery Method): room air      General Examination:  General: No acute distress  HEENT: Atraumatic, Normocephalic  Respiratory: CTA B/l.  No crackles, rhonchi, or wheezes.  Cardiovascular: RRR.  Normal S1 & S2.  Normal b/l radial and pedal pulses.    Neurological Examination:  General / Mental Status: AAO x 3.  No aphasia or dysarthria.  Naming and repetition intact.  Cranial Nerves: VFF x 4.  PERRL.  EOMI x 2, No nystagmus or diplopia.  B/l V1-V3 equal and intact to light touch and pinprick.  Symmetric facial movement and palate elevation.  B/l hearing equal to finger rub.  5/5 strength with b/l sternocleidomastoid & trapezius.  Midline tongue protrusion, with no atrophy or fasciculations.  Motor: Normal bulk & tone in all four extremities.  5/5 strength throughout all four extremities.  No downward drift, rigidity, spasticity, or tremors in any of the four extremities.  Sensory: Intact to light touch and pinprick in all four extremities.  Reflex: 2+ and symmetric at b/l biceps, triceps, brachioradialis, patellae, and ankles.  Downgoing toes b/l.  Coordination: No dysmetria with b/l finger-to-nose and heel raise tests.  Gait and Romberg sign testing deferred per patient preference.    NIHSS 0 - No IV TNK due to resolution of symptoms at the time of presentation  MRS 1          LABORATORY VALUES:                        12.5   8.71  )-----------( 251      ( 05 Mar 2023 06:58 )             39.7       03-05    139  |  105  |  10  ----------------------------<  121<H>  4.1   |  27  |  0.71    Ca    9.2      05 Mar 2023 06:58  Phos  2.3     03-05  Mg     1.9     03-05    TPro  7.9  /  Alb  3.7  /  TBili  0.9  /  DBili  x   /  AST  35  /  ALT  21  /  AlkPhos  97  03-04    03-05 Chol 188 LDL 88 HDL 29<L> Trig 355<H>    A1C with Estimated Average Glucose (03.05.23 @ 06:58)   A1C with Estimated Average Glucose Result: 6.7%   Estimated Average Glucose: 146. mg/dL           NEUROIMAGING:      CT Head & CTA Head/Neck & CT Perfusion Head (3/4/23):  - No acute intracranial abnormality  - No intracranial or neck vessel abnormality  - No focal hypoperfusion          Please contact the Neurology consult service with any neurological questions.    Enrrique Robbins MD   of Neurology  [facility] at [facility]

## 2023-03-06 ENCOUNTER — TRANSCRIPTION ENCOUNTER (OUTPATIENT)
Age: 73
End: 2023-03-06

## 2023-03-06 VITALS — TEMPERATURE: 98 F

## 2023-03-06 LAB
ANION GAP SERPL CALC-SCNC: 6 MMOL/L — SIGNIFICANT CHANGE UP (ref 5–17)
BUN SERPL-MCNC: 8 MG/DL — SIGNIFICANT CHANGE UP (ref 7–18)
CALCIUM SERPL-MCNC: 9.7 MG/DL — SIGNIFICANT CHANGE UP (ref 8.4–10.5)
CHLORIDE SERPL-SCNC: 107 MMOL/L — SIGNIFICANT CHANGE UP (ref 96–108)
CO2 SERPL-SCNC: 27 MMOL/L — SIGNIFICANT CHANGE UP (ref 22–31)
CREAT SERPL-MCNC: 0.7 MG/DL — SIGNIFICANT CHANGE UP (ref 0.5–1.3)
EGFR: 99 ML/MIN/1.73M2 — SIGNIFICANT CHANGE UP
GLUCOSE BLDC GLUCOMTR-MCNC: 129 MG/DL — HIGH (ref 70–99)
GLUCOSE BLDC GLUCOMTR-MCNC: 155 MG/DL — HIGH (ref 70–99)
GLUCOSE SERPL-MCNC: 137 MG/DL — HIGH (ref 70–99)
HCT VFR BLD CALC: 41.4 % — SIGNIFICANT CHANGE UP (ref 39–50)
HGB BLD-MCNC: 12.9 G/DL — LOW (ref 13–17)
MCHC RBC-ENTMCNC: 20.1 PG — LOW (ref 27–34)
MCHC RBC-ENTMCNC: 31.2 GM/DL — LOW (ref 32–36)
MCV RBC AUTO: 64.6 FL — LOW (ref 80–100)
NRBC # BLD: 0 /100 WBCS — SIGNIFICANT CHANGE UP (ref 0–0)
PLATELET # BLD AUTO: 260 K/UL — SIGNIFICANT CHANGE UP (ref 150–400)
POTASSIUM SERPL-MCNC: 3.8 MMOL/L — SIGNIFICANT CHANGE UP (ref 3.5–5.3)
POTASSIUM SERPL-SCNC: 3.8 MMOL/L — SIGNIFICANT CHANGE UP (ref 3.5–5.3)
RBC # BLD: 6.41 M/UL — HIGH (ref 4.2–5.8)
RBC # FLD: 17.8 % — HIGH (ref 10.3–14.5)
SODIUM SERPL-SCNC: 140 MMOL/L — SIGNIFICANT CHANGE UP (ref 135–145)
WBC # BLD: 8.51 K/UL — SIGNIFICANT CHANGE UP (ref 3.8–10.5)
WBC # FLD AUTO: 8.51 K/UL — SIGNIFICANT CHANGE UP (ref 3.8–10.5)

## 2023-03-06 PROCEDURE — 83735 ASSAY OF MAGNESIUM: CPT

## 2023-03-06 PROCEDURE — 83036 HEMOGLOBIN GLYCOSYLATED A1C: CPT

## 2023-03-06 PROCEDURE — 84100 ASSAY OF PHOSPHORUS: CPT

## 2023-03-06 PROCEDURE — 85025 COMPLETE CBC W/AUTO DIFF WBC: CPT

## 2023-03-06 PROCEDURE — 85730 THROMBOPLASTIN TIME PARTIAL: CPT

## 2023-03-06 PROCEDURE — 82962 GLUCOSE BLOOD TEST: CPT

## 2023-03-06 PROCEDURE — 80053 COMPREHEN METABOLIC PANEL: CPT

## 2023-03-06 PROCEDURE — 70498 CT ANGIOGRAPHY NECK: CPT | Mod: MA

## 2023-03-06 PROCEDURE — 93306 TTE W/DOPPLER COMPLETE: CPT

## 2023-03-06 PROCEDURE — 94640 AIRWAY INHALATION TREATMENT: CPT

## 2023-03-06 PROCEDURE — 99285 EMERGENCY DEPT VISIT HI MDM: CPT

## 2023-03-06 PROCEDURE — 85610 PROTHROMBIN TIME: CPT

## 2023-03-06 PROCEDURE — 0042T: CPT | Mod: MA

## 2023-03-06 PROCEDURE — 71046 X-RAY EXAM CHEST 2 VIEWS: CPT

## 2023-03-06 PROCEDURE — 70496 CT ANGIOGRAPHY HEAD: CPT | Mod: MA

## 2023-03-06 PROCEDURE — 93306 TTE W/DOPPLER COMPLETE: CPT | Mod: 26

## 2023-03-06 PROCEDURE — 80061 LIPID PANEL: CPT

## 2023-03-06 PROCEDURE — 84484 ASSAY OF TROPONIN QUANT: CPT

## 2023-03-06 PROCEDURE — 70450 CT HEAD/BRAIN W/O DYE: CPT | Mod: MA

## 2023-03-06 PROCEDURE — 85027 COMPLETE CBC AUTOMATED: CPT

## 2023-03-06 PROCEDURE — 86803 HEPATITIS C AB TEST: CPT

## 2023-03-06 PROCEDURE — 80048 BASIC METABOLIC PNL TOTAL CA: CPT

## 2023-03-06 PROCEDURE — 87635 SARS-COV-2 COVID-19 AMP PRB: CPT

## 2023-03-06 PROCEDURE — 36415 COLL VENOUS BLD VENIPUNCTURE: CPT

## 2023-03-06 RX ORDER — METFORMIN HYDROCHLORIDE 850 MG/1
1 TABLET ORAL
Qty: 60 | Refills: 0
Start: 2023-03-06 | End: 2023-04-04

## 2023-03-06 RX ORDER — ROSUVASTATIN CALCIUM 5 MG/1
1 TABLET ORAL
Qty: 30 | Refills: 0
Start: 2023-03-06 | End: 2023-04-04

## 2023-03-06 RX ORDER — ALBUTEROL 90 UG/1
2 AEROSOL, METERED ORAL
Qty: 0 | Refills: 0 | DISCHARGE
Start: 2023-03-06

## 2023-03-06 RX ORDER — ASPIRIN/CALCIUM CARB/MAGNESIUM 324 MG
1 TABLET ORAL
Qty: 0 | Refills: 0 | DISCHARGE

## 2023-03-06 RX ORDER — ATORVASTATIN CALCIUM 80 MG/1
1 TABLET, FILM COATED ORAL
Qty: 30 | Refills: 0
Start: 2023-03-06 | End: 2023-04-04

## 2023-03-06 RX ORDER — METFORMIN HYDROCHLORIDE 850 MG/1
0 TABLET ORAL
Qty: 0 | Refills: 0 | DISCHARGE

## 2023-03-06 RX ADMIN — ALBUTEROL 2 PUFF(S): 90 AEROSOL, METERED ORAL at 11:21

## 2023-03-06 RX ADMIN — ALBUTEROL 2 PUFF(S): 90 AEROSOL, METERED ORAL at 05:48

## 2023-03-06 RX ADMIN — Medication 200 MILLIGRAM(S): at 11:22

## 2023-03-06 RX ADMIN — ENOXAPARIN SODIUM 40 MILLIGRAM(S): 100 INJECTION SUBCUTANEOUS at 05:49

## 2023-03-06 RX ADMIN — Medication 1: at 12:11

## 2023-03-06 RX ADMIN — Medication 81 MILLIGRAM(S): at 11:21

## 2023-03-06 NOTE — DISCHARGE NOTE PROVIDER - CARE PROVIDERS DIRECT ADDRESSES
[email or web address]

## 2023-03-06 NOTE — DISCHARGE NOTE PROVIDER - CARE PROVIDER_API CALL
Agustin Cody)  Clinical Neurophysiology; Neurology; Sleep Medicine  [address] [facility], [address]nd [place] [address]  Phone: [phone]  Fax: [phone]  Follow Up Time: 2 weeks   Agustin Cody)  Clinical Neurophysiology; Neurology; Sleep Medicine  [address] [facility], [address]nd [place] [address]  Phone: [phone]  Fax: [phone]  Follow Up Time: 2 weeks    Richard Hernandez  [address] [place] [address]  [phone]  Phone: [phone]  Fax: [phone]  Follow Up Time:

## 2023-03-06 NOTE — CHART NOTE - NSCHARTNOTEFT_GEN_A_CORE
Pt seen and examined, offering no new complaints, ambulating in room independently  Pt for discharge back home, hospital course, treatment plan/options, medication changes and outpt follow up reviewed at length with good understanding, all questions answered.     D/C plan discussed with Dr. Hernandez

## 2023-03-06 NOTE — DISCHARGE NOTE PROVIDER - NSDCCPCAREPLAN_GEN_ALL_CORE_FT
PRINCIPAL DISCHARGE DIAGNOSIS  Diagnosis: Brain TIA  Assessment and Plan of Treatment: You presented to hospital with slurred speech which was concerning for TIA. CT angio of head and neck was done which did not show any acute findings. You were evaluated by Neurologist and you were continued on low dose Aspirin and cholesterol medications for stroke prevention   Your symptoms resolved and remained stable   Continue medications as prescribed   Follow up with PCP and Neurologist outpatient      SECONDARY DISCHARGE DIAGNOSES  Diagnosis: DM (diabetes mellitus)  Assessment and Plan of Treatment: Type 2 diabetes means your pancreas does not make enough insulin, or your body does not use insulin well. Insulin helps move sugar out of the blood so it can be used for energy. Diabetes cannot be cured, but it can be managed.  Make healthy food choices:  Carbohydrates can raise your blood sugar level if you eat too many at one time. Examples of foods that contain carbohydrates are breads, cereals, rice, pasta, and sweets.  Get regular physical activity. Physical activity can help you get to your target blood sugar level goal and manage your weight. Get at least 150 minutes of moderate to vigorous aerobic physical activity each week. Do not miss more than 2 days in a row.   If your Diabetes that is not well controlled it can lead to health problems. Examples include foot sores, retinopathy (vision loss), and peripheral neuropathy (loss of feeling in your hands and feet).

## 2023-03-06 NOTE — DISCHARGE NOTE PROVIDER - HOSPITAL COURSE
72 year old male with PMHx TIA and DM presents with slurred speech since Pt states that he woke up more fatigued than usual this morning, however at 3pm, pt began slurring his speech. He states he was talking to his wife and noticed that it was hard for him to lift his tongue and pronounce certain letters. His wife told him that his face looked "swollen" but he had no facial droop at the time. Pt denies any other focal deficits at the time including trouble swallowing vision difficulty, or focal extremity weakness. He states that symptoms resolved at around 530pm. Admitted to tele for concern of TIA, NIHSS 0 on admission. CT head with no findings,  passed dysphagia screen in ED, started on aspirin and high dose statin. Pt at baseline, does not need PT eval. Neuro Robbins consulted and recommended ASA and Statin.   Clinically improved, optimized for discharge with oupt follow up   Please note that this a brief summary of hospital course please refer to daily progress notes and consult notes for full course and events     72 year old male with PMHx TIA and DM presents with slurred speech since Pt states that he woke up more fatigued than usual this morning, however at 3pm, pt began slurring his speech. He states he was talking to his wife and noticed that it was hard for him to lift his tongue and pronounce certain letters. His wife told him that his face looked "swollen" but he had no facial droop at the time. Pt denies any other focal deficits at the time including trouble swallowing vision difficulty, or focal extremity weakness. He states that symptoms resolved at around 530pm. Admitted to tele for concern of TIA, NIHSS 0 on admission. CT head with no findings,  passed dysphagia screen in ED, started on aspirin and high dose statin. Pt at baseline, does not need PT eval. Neuro Robbins consulted and recommended ASA and Statin.   Echo with preserved EF   Clinically improved, optimized for discharge with oupt follow up   Please note that this a brief summary of hospital course please refer to daily progress notes and consult notes for full course and events

## 2023-03-06 NOTE — DISCHARGE NOTE PROVIDER - NSDCMRMEDTOKEN_GEN_ALL_CORE_FT
Aspir 81 oral delayed release tablet: 1 tab(s) orally once a day  METFORMIN 500MG TAB:    albuterol 90 mcg/inh inhalation aerosol: 2 puff(s) inhaled every 6 hours  Aspir 81 oral delayed release tablet: 1 tab(s) orally once a day  guaiFENesin 100 mg/5 mL oral liquid: 10 milliliter(s) orally every 6 hours  Lipitor 40 mg oral tablet: 1 tab(s) orally once a day  metFORMIN 500 mg oral tablet: 1 tab(s) orally 2 times a day    albuterol 90 mcg/inh inhalation aerosol: 2 puff(s) inhaled every 6 hours  Aspir 81 oral delayed release tablet: 1 tab(s) orally once a day  glucometer (per patient&#x27;s insurance): Test blood sugars four times a day. Dispense #1 glucometer.  guaiFENesin 100 mg/5 mL oral liquid: 10 milliliter(s) orally every 6 hours  lancets: 1 application subcutaneously 2 times a day   metFORMIN 500 mg oral tablet: 1 tab(s) orally 2 times a day   rosuvastatin 20 mg oral tablet: 1 tab(s) orally once a day   test strips (per patient&#x27;s insurance): 1 application subcutaneously 2 times a day . ** Compatible with patient&#x27;s glucometer **

## 2023-03-06 NOTE — DISCHARGE NOTE PROVIDER - PROVIDER TOKENS
PROVIDER:[TOKEN:[[id number]],FOLLOWUP:[2 weeks]] PROVIDER:[TOKEN:[[id number]],FOLLOWUP:[2 weeks]],FREE:[LAST:[Hernandez],FIRST:[Richard],PHONE:[phone]],FAX:[phone]],ADDRESS:[[address] [place] [address]  [phone]]]

## 2023-03-06 NOTE — DISCHARGE NOTE NURSING/CASE MANAGEMENT/SOCIAL WORK - NSDCPEFALRISK_GEN_ALL_CORE
For information on Fall & Injury Prevention, visit: [email or web address] OR  [email or web address] OR  https://www.cdc.gov/steadi/patient.html

## 2023-03-06 NOTE — DISCHARGE NOTE NURSING/CASE MANAGEMENT/SOCIAL WORK - PATIENT PORTAL LINK FT
You can access the FollowMyHealth Patient Portal offered by [facility] by registering at the following website: [email or web address]. By joining [facility]’s FollowMyHealth portal, you will also be able to view your health information using other applications (apps) compatible with our system.

## 2023-03-07 ENCOUNTER — APPOINTMENT (OUTPATIENT)
Dept: OTOLARYNGOLOGY | Facility: CLINIC | Age: 73
End: 2023-03-07

## 2023-03-10 ENCOUNTER — TRANSCRIPTION ENCOUNTER (OUTPATIENT)
Age: 73
End: 2023-03-10

## 2023-04-06 PROBLEM — G45.9 TRANSIENT CEREBRAL ISCHEMIC ATTACK, UNSPECIFIED: Chronic | Status: ACTIVE | Noted: 2023-03-04

## 2023-04-06 PROBLEM — E11.9 TYPE 2 DIABETES MELLITUS WITHOUT COMPLICATIONS: Chronic | Status: ACTIVE | Noted: 2023-03-04

## 2023-04-13 ENCOUNTER — APPOINTMENT (OUTPATIENT)
Dept: FAMILY MEDICINE | Facility: CLINIC | Age: 73
End: 2023-04-13
Payer: MEDICARE

## 2023-04-13 VITALS
TEMPERATURE: 98 F | DIASTOLIC BLOOD PRESSURE: 86 MMHG | BODY MASS INDEX: 27.47 KG/M2 | HEART RATE: 75 BPM | OXYGEN SATURATION: 96 % | HEIGHT: 67 IN | WEIGHT: 175 LBS | SYSTOLIC BLOOD PRESSURE: 132 MMHG

## 2023-04-13 DIAGNOSIS — Z80.41 FAMILY HISTORY OF MALIGNANT NEOPLASM OF OVARY: ICD-10-CM

## 2023-04-13 DIAGNOSIS — Z87.19 PERSONAL HISTORY OF OTHER DISEASES OF THE DIGESTIVE SYSTEM: ICD-10-CM

## 2023-04-13 DIAGNOSIS — Z86.73 PERSONAL HISTORY OF TRANSIENT ISCHEMIC ATTACK (TIA), AND CEREBRAL INFARCTION W/OUT RESIDUAL DEFICITS: ICD-10-CM

## 2023-04-13 DIAGNOSIS — Z84.1 FAMILY HISTORY OF DISORDERS OF KIDNEY AND URETER: ICD-10-CM

## 2023-04-13 DIAGNOSIS — Z87.891 PERSONAL HISTORY OF NICOTINE DEPENDENCE: ICD-10-CM

## 2023-04-13 DIAGNOSIS — Z83.3 FAMILY HISTORY OF DIABETES MELLITUS: ICD-10-CM

## 2023-04-13 LAB
GLUCOSE BLDC GLUCOMTR-MCNC: 105
HBA1C MFR BLD HPLC: 6.3

## 2023-04-13 PROCEDURE — 83036 HEMOGLOBIN GLYCOSYLATED A1C: CPT | Mod: QW

## 2023-04-13 PROCEDURE — 99204 OFFICE O/P NEW MOD 45 MIN: CPT | Mod: 25

## 2023-04-13 PROCEDURE — 82962 GLUCOSE BLOOD TEST: CPT

## 2023-04-13 RX ORDER — ASPIRIN 81 MG
81 TABLET, DELAYED RELEASE (ENTERIC COATED) ORAL
Refills: 0 | Status: ACTIVE | COMMUNITY

## 2023-04-13 NOTE — HISTORY OF PRESENT ILLNESS
[FreeTextEntry8] : Patient presents to today to establish care. Feels well today. Does admit to a 5-6 year hx of ?shallow breathing; states he was evaluated by a pulmonologist about a year ago abd was told his lungs are working at 50%. \par \par States he went to [facility] in March for slurred speech and was diagnosed with a TIA. Has not followed up with a neurologist since discharge. He had a TTE which showed normal EF. CT head negative for CVA. CTA head and neck were wnl. CxR was also done at that time for wheezing which showed a trace pleural effusion and p was prescribed and albuterol inhaler.\par \par Follows with endocrinology for hx of diabetes.

## 2023-04-13 NOTE — PHYSICAL EXAM
[No Acute Distress] : no acute distress [Well Nourished] : well nourished [Well Developed] : well developed [Well-Appearing] : well-appearing [Normal Sclera/Conjunctiva] : normal sclera/conjunctiva [EOMI] : extraocular movements intact [Normal Outer Ear/Nose] : the outer ears and nose were normal in appearance [Normal Nasal Mucosa] : the nasal mucosa was normal [No Lymphadenopathy] : no lymphadenopathy [Supple] : supple [No Respiratory Distress] : no respiratory distress  [No Accessory Muscle Use] : no accessory muscle use [Clear to Auscultation] : lungs were clear to auscultation bilaterally [Normal Rate] : normal rate  [Regular Rhythm] : with a regular rhythm [Normal S1, S2] : normal S1 and S2 [No Edema] : there was no peripheral edema [Soft] : abdomen soft [Non Tender] : non-tender [Non-distended] : non-distended [Normal Bowel Sounds] : normal bowel sounds [Normal Posterior Cervical Nodes] : no posterior cervical lymphadenopathy [Normal Anterior Cervical Nodes] : no anterior cervical lymphadenopathy [No Joint Swelling] : no joint swelling [Grossly Normal Strength/Tone] : grossly normal strength/tone [Coordination Grossly Intact] : coordination grossly intact [Normal Affect] : the affect was normal [Normal Insight/Judgement] : insight and judgment were intact

## 2023-04-13 NOTE — PLAN
[FreeTextEntry1] : 1. Wheezing\par - no currently wheezing on exam\par - Pt is a former smoker and quit 16 years ago\par - admits to long standing hx (5-6years) of what feels like shallow breathing and intermittent wheezing\par - c/w albuterol inhaler prn\par - States he was evaluated by a pulmonologist about a year ago and was told his "lungs were working at 50%" \par - Referral to pulmonology for PFT's provided\par \par 2. DM\par - A1c 6.3; improved from 6.7 in March. \par - Follows with endocrinology and was started on metformin 500mg daily and farxiga 5mg daily\par - c/w low carb and low sugar diet\par - on statin \par - will discuss initiation of ACE-i or ARB and next visit\par \par 3. TIA\par - Hx of likely TIA in 3/2023 which presented with slurred speech -- was admitted to [facility] and evaluated by cardio and neurology inpatient\par - He had a TTE which showed normal EF. CT head negative for CVA. CTA head and neck were wnl.\par - on statin and ASA\par - Yet to establish care with neurology outpatient \par \par 4. Hx of Vitamin B12 Deficiency\par - serum vitamin B12 level ordered \par \par 3 month f/u recommended \par

## 2023-04-13 NOTE — REVIEW OF SYSTEMS
[Fever] : no fever [Chills] : no chills [Pain] : no pain [Vision Problems] : no vision problems [Nasal Discharge] : no nasal discharge [Sore Throat] : no sore throat [Chest Pain] : no chest pain [Palpitations] : no palpitations [Lower Ext Edema] : no lower extremity edema [Shortness Of Breath] : no shortness of breath [Wheezing] : wheezing [Cough] : no cough [Dyspnea on Exertion] : not dyspnea on exertion [Diarrhea] : no diarrhea [Vomiting] : no vomiting [Muscle Pain] : no muscle pain [Muscle Weakness] : no muscle weakness [Itching] : no itching [Skin Rash] : no skin rash [Headache] : no headache [Dizziness] : no dizziness

## 2023-04-17 ENCOUNTER — NON-APPOINTMENT (OUTPATIENT)
Age: 73
End: 2023-04-17

## 2023-04-17 LAB
HCT VFR BLD CALC: 44.7 %
HGB BLD-MCNC: 13.4 G/DL
MCHC RBC-ENTMCNC: 20.4 PG
MCHC RBC-ENTMCNC: 30 GM/DL
MCV RBC AUTO: 67.9 FL
PLATELET # BLD AUTO: 183 K/UL
RBC # BLD: 6.58 M/UL
RBC # FLD: 19.1 %
VIT B12 SERPL-MCNC: 164 PG/ML
WBC # FLD AUTO: 8.52 K/UL

## 2023-05-10 ENCOUNTER — APPOINTMENT (OUTPATIENT)
Dept: PULMONOLOGY | Facility: CLINIC | Age: 73
End: 2023-05-10

## 2023-07-06 ENCOUNTER — APPOINTMENT (OUTPATIENT)
Dept: FAMILY MEDICINE | Facility: CLINIC | Age: 73
End: 2023-07-06
Payer: MEDICARE

## 2023-07-06 VITALS
HEART RATE: 85 BPM | BODY MASS INDEX: 28.25 KG/M2 | HEIGHT: 67 IN | SYSTOLIC BLOOD PRESSURE: 154 MMHG | TEMPERATURE: 97.4 F | OXYGEN SATURATION: 95 % | DIASTOLIC BLOOD PRESSURE: 89 MMHG | WEIGHT: 180 LBS

## 2023-07-06 VITALS — SYSTOLIC BLOOD PRESSURE: 115 MMHG | DIASTOLIC BLOOD PRESSURE: 85 MMHG

## 2023-07-06 DIAGNOSIS — R79.89 OTHER SPECIFIED ABNORMAL FINDINGS OF BLOOD CHEMISTRY: ICD-10-CM

## 2023-07-06 DIAGNOSIS — Z87.09 PERSONAL HISTORY OF OTHER DISEASES OF THE RESPIRATORY SYSTEM: ICD-10-CM

## 2023-07-06 DIAGNOSIS — E53.8 DEFICIENCY OF OTHER SPECIFIED B GROUP VITAMINS: ICD-10-CM

## 2023-07-06 LAB
GLUCOSE BLDC GLUCOMTR-MCNC: 362
HBA1C MFR BLD HPLC: 7.2

## 2023-07-06 PROCEDURE — 99214 OFFICE O/P EST MOD 30 MIN: CPT | Mod: 25

## 2023-07-06 PROCEDURE — 82962 GLUCOSE BLOOD TEST: CPT

## 2023-07-06 PROCEDURE — 83036 HEMOGLOBIN GLYCOSYLATED A1C: CPT | Mod: QW

## 2023-07-06 RX ORDER — ALBUTEROL SULFATE 90 UG/1
108 (90 BASE) INHALANT RESPIRATORY (INHALATION)
Refills: 0 | Status: DISCONTINUED | COMMUNITY
End: 2023-07-06

## 2023-07-06 NOTE — PHYSICAL EXAM
[No Acute Distress] : no acute distress [Well Nourished] : well nourished [Well Developed] : well developed [Well-Appearing] : well-appearing [Normal Sclera/Conjunctiva] : normal sclera/conjunctiva [EOMI] : extraocular movements intact [Normal Outer Ear/Nose] : the outer ears and nose were normal in appearance [Normal Nasal Mucosa] : the nasal mucosa was normal [No Lymphadenopathy] : no lymphadenopathy [Supple] : supple [No Respiratory Distress] : no respiratory distress  [No Accessory Muscle Use] : no accessory muscle use [Clear to Auscultation] : lungs were clear to auscultation bilaterally [Normal Rate] : normal rate  [Regular Rhythm] : with a regular rhythm [Normal S1, S2] : normal S1 and S2 [No Edema] : there was no peripheral edema [Soft] : abdomen soft [Non Tender] : non-tender [Non-distended] : non-distended [Normal Bowel Sounds] : normal bowel sounds [Normal Posterior Cervical Nodes] : no posterior cervical lymphadenopathy [Normal Anterior Cervical Nodes] : no anterior cervical lymphadenopathy [No Joint Swelling] : no joint swelling [Grossly Normal Strength/Tone] : grossly normal strength/tone [Coordination Grossly Intact] : coordination grossly intact [Normal Affect] : the affect was normal [Normal Insight/Judgement] : insight and judgment were intact [No CVA Tenderness] : no CVA  tenderness [No Spinal Tenderness] : no spinal tenderness [Normal Gait] : normal gait

## 2023-07-06 NOTE — HISTORY OF PRESENT ILLNESS
[FreeTextEntry1] : Follow Up [de-identified] : The patient presents for a follow up today. Admits to feeling chronically fatigued and admits to generalized body aches for the last 5 years\par \par States he went to [facility] in March for slurred speech and was diagnosed with a TIA. Has not followed up with a neurologist since discharge. He had a TTE which showed normal EF. CT head negative for CVA. CTA head and neck were wnl. CxR was also done at that time for wheezing which showed a trace pleural effusion and p was prescribed and albuterol inhaler.\par \par Follows with endocrinology for hx of diabetes. States he was recently started on Farxiga, but BG is still high at times

## 2023-07-06 NOTE — PLAN
[FreeTextEntry1] : 1. Wheezing\par - no current wheezing\par - Pt is a former smoker and quit 16 years ago\par - admits to long standing hx (5-6years) of what feels like shallow breathing and intermittent wheezing\par - does not use albuterol inhaler\par - States he was evaluated by a pulmonologist about a year ago and was told his "lungs were working at 50%" \par - Referral to pulmonology for PFT's provided previously -- he has not yet established care\par \par 2. DM\par - A1c 6.3; --> 7.2%; , but pt admits to eating prior to appt\par - States he followed up with his endocrinologist yetserday and no medication adjustments were made yet -- c/w metformin 500mg daily and farxiga 5mg daily. Per pt his endocrinologist ordered blood work and will adjust medication after results are reviewed -- pt instructed to f/u with his endocrinologist as soon as possible if BG remains uncontrolled \par - low carb and low sugar diet and regular exercise again encouraged and emphasized -- pt admits to eating more carbohydrates and exercising less lately \par - on statin and vascepa as pt also with HLD\par - started on low dose lisinopril today\par \par 3. TIA\par - Hx of likely TIA in 3/2023 which presented with slurred speech -- was admitted to [facility] and evaluated by cardio and neurology inpatient\par - He had a TTE which showed normal EF. CT head negative for CVA. CTA head and neck were wnl.\par - on statin and ASA\par - Has not yet established care with neurology outpatient -- referral provided \par \par 4. Hx of Vitamin B12 Deficiency\par - serum vitamin B12 level ordered \par \par 5. Fatigue and Generalized Body Aches\par - chronic, x5 years\par - CBC, CMP, B12, folate, Vitamin D ordered\par - HARRY, Rf, CRP, and ESR ordered -- pt also provided with rheumatology referral\par \par 6. Hx of Nasal Polyps\par - Referral for ENT provided\par \par 3 month f/u for CPE recommended \par .

## 2023-07-06 NOTE — REVIEW OF SYSTEMS
[Fatigue] : fatigue [Fever] : no fever [Chills] : no chills [Pain] : no pain [Vision Problems] : no vision problems [Nasal Discharge] : no nasal discharge [Sore Throat] : no sore throat [Chest Pain] : no chest pain [Palpitations] : no palpitations [Lower Ext Edema] : no lower extremity edema [Shortness Of Breath] : no shortness of breath [Wheezing] : no wheezing [Cough] : no cough [Dyspnea on Exertion] : not dyspnea on exertion [Diarrhea] : no diarrhea [Vomiting] : no vomiting [Dysuria] : no dysuria [Hematuria] : no hematuria [Muscle Weakness] : no muscle weakness [Itching] : no itching [Skin Rash] : no skin rash [Headache] : no headache [Dizziness] : no dizziness [Easy Bleeding] : no easy bleeding [Easy Bruising] : no easy bruising [FreeTextEntry9] : Generalized muscle aches

## 2023-07-10 ENCOUNTER — LABORATORY RESULT (OUTPATIENT)
Age: 73
End: 2023-07-10

## 2023-07-17 DIAGNOSIS — E55.9 VITAMIN D DEFICIENCY, UNSPECIFIED: ICD-10-CM

## 2023-07-17 LAB
25(OH)D3 SERPL-MCNC: 11.3 NG/ML
ALBUMIN SERPL ELPH-MCNC: 4.7 G/DL
ALP BLD-CCNC: 111 U/L
ALT SERPL-CCNC: 17 U/L
ANA SER IF-ACNC: NEGATIVE
ANION GAP SERPL CALC-SCNC: 13 MMOL/L
AST SERPL-CCNC: 19 U/L
BILIRUB SERPL-MCNC: 0.6 MG/DL
BUN SERPL-MCNC: 11 MG/DL
CALCIUM SERPL-MCNC: 9.8 MG/DL
CHLORIDE SERPL-SCNC: 98 MMOL/L
CO2 SERPL-SCNC: 26 MMOL/L
CREAT SERPL-MCNC: 0.73 MG/DL
CRP SERPL-MCNC: 6 MG/L
EGFR: 98 ML/MIN/1.73M2
ERYTHROCYTE [SEDIMENTATION RATE] IN BLOOD BY WESTERGREN METHOD: 34 MM/HR
FOLATE SERPL-MCNC: 17 NG/ML
GLUCOSE SERPL-MCNC: 143 MG/DL
POTASSIUM SERPL-SCNC: 4.5 MMOL/L
PROT SERPL-MCNC: 7.5 G/DL
RHEUMATOID FACT SER QL: <10 IU/ML
SODIUM SERPL-SCNC: 137 MMOL/L
TSH SERPL-ACNC: 7.6 UIU/ML
VIT B12 SERPL-MCNC: 634 PG/ML

## 2023-08-18 RX ORDER — BLOOD-GLUCOSE METER
W/DEVICE EACH MISCELLANEOUS
Qty: 1 | Refills: 0 | Status: DISCONTINUED | COMMUNITY
Start: 2023-04-13 | End: 2023-08-18

## 2023-08-18 RX ORDER — BLOOD-GLUCOSE METER
W/DEVICE EACH MISCELLANEOUS
Qty: 1 | Refills: 0 | Status: ACTIVE | COMMUNITY
Start: 2023-08-18 | End: 1900-01-01

## 2023-09-28 ENCOUNTER — RX RENEWAL (OUTPATIENT)
Age: 73
End: 2023-09-28

## 2023-12-18 ENCOUNTER — RX RENEWAL (OUTPATIENT)
Age: 73
End: 2023-12-18

## 2024-01-11 ENCOUNTER — APPOINTMENT (OUTPATIENT)
Dept: FAMILY MEDICINE | Facility: CLINIC | Age: 74
End: 2024-01-11

## 2024-04-09 RX ORDER — METFORMIN HYDROCHLORIDE 500 MG/1
500 TABLET, COATED ORAL
Qty: 30 | Refills: 0 | Status: ACTIVE | COMMUNITY
Start: 1900-01-01 | End: 1900-01-01

## 2024-04-29 ENCOUNTER — NON-APPOINTMENT (OUTPATIENT)
Age: 74
End: 2024-04-29

## 2024-04-29 ENCOUNTER — APPOINTMENT (OUTPATIENT)
Dept: FAMILY MEDICINE | Facility: CLINIC | Age: 74
End: 2024-04-29
Payer: MEDICARE

## 2024-04-29 ENCOUNTER — LABORATORY RESULT (OUTPATIENT)
Age: 74
End: 2024-04-29

## 2024-04-29 VITALS
SYSTOLIC BLOOD PRESSURE: 135 MMHG | OXYGEN SATURATION: 96 % | HEART RATE: 72 BPM | DIASTOLIC BLOOD PRESSURE: 85 MMHG | WEIGHT: 182 LBS | BODY MASS INDEX: 28.56 KG/M2 | HEIGHT: 67 IN | TEMPERATURE: 98 F

## 2024-04-29 DIAGNOSIS — E03.8 OTHER SPECIFIED HYPOTHYROIDISM: ICD-10-CM

## 2024-04-29 DIAGNOSIS — R06.2 WHEEZING: ICD-10-CM

## 2024-04-29 DIAGNOSIS — R79.82 ELEVATED C-REACTIVE PROTEIN (CRP): ICD-10-CM

## 2024-04-29 DIAGNOSIS — Z00.00 ENCOUNTER FOR GENERAL ADULT MEDICAL EXAMINATION W/OUT ABNORMAL FINDINGS: ICD-10-CM

## 2024-04-29 DIAGNOSIS — E11.9 TYPE 2 DIABETES MELLITUS W/OUT COMPLICATIONS: ICD-10-CM

## 2024-04-29 DIAGNOSIS — R52 PAIN, UNSPECIFIED: ICD-10-CM

## 2024-04-29 DIAGNOSIS — E78.5 HYPERLIPIDEMIA, UNSPECIFIED: ICD-10-CM

## 2024-04-29 DIAGNOSIS — Z13.6 ENCOUNTER FOR SCREENING FOR CARDIOVASCULAR DISORDERS: ICD-10-CM

## 2024-04-29 DIAGNOSIS — R53.83 OTHER FATIGUE: ICD-10-CM

## 2024-04-29 DIAGNOSIS — Z12.11 ENCOUNTER FOR SCREENING FOR MALIGNANT NEOPLASM OF COLON: ICD-10-CM

## 2024-04-29 DIAGNOSIS — Z12.5 ENCOUNTER FOR SCREENING FOR MALIGNANT NEOPLASM OF PROSTATE: ICD-10-CM

## 2024-04-29 PROCEDURE — 93000 ELECTROCARDIOGRAM COMPLETE: CPT

## 2024-04-29 PROCEDURE — G0439: CPT

## 2024-04-29 RX ORDER — LISINOPRIL 5 MG/1
5 TABLET ORAL DAILY
Qty: 90 | Refills: 0 | Status: DISCONTINUED | COMMUNITY
Start: 2023-07-06 | End: 2024-04-29

## 2024-04-29 RX ORDER — DAPAGLIFLOZIN 5 MG/1
5 TABLET, FILM COATED ORAL DAILY
Qty: 90 | Refills: 0 | Status: DISCONTINUED | COMMUNITY
End: 2024-04-29

## 2024-04-29 RX ORDER — BLOOD SUGAR DIAGNOSTIC
STRIP MISCELLANEOUS 4 TIMES DAILY
Qty: 3 | Refills: 3 | Status: ACTIVE | COMMUNITY
Start: 2023-08-18 | End: 1900-01-01

## 2024-04-29 RX ORDER — ISOPROPYL ALCOHOL 0.7 ML/ML
SWAB TOPICAL
Qty: 1 | Refills: 2 | Status: ACTIVE | COMMUNITY
Start: 2023-04-13 | End: 1900-01-01

## 2024-04-29 RX ORDER — CHOLECALCIFEROL (VITAMIN D3) 1250 MCG
1.25 MG CAPSULE ORAL
Qty: 13 | Refills: 1 | Status: DISCONTINUED | COMMUNITY
Start: 2023-07-17 | End: 2024-04-29

## 2024-04-29 RX ORDER — ASCORBIC ACID 125 MG
TABLET,CHEWABLE ORAL
Refills: 0 | Status: DISCONTINUED | COMMUNITY
End: 2024-04-29

## 2024-04-29 NOTE — HEALTH RISK ASSESSMENT
[No] : In the past 12 months have you used drugs other than those required for medical reasons? No [No falls in past year] : Patient reported no falls in the past year [Little interest or pleasure doing things] : 1) Little interest or pleasure doing things [Feeling down, depressed, or hopeless] : 2) Feeling down, depressed, or hopeless [0] : 2) Feeling down, depressed, or hopeless: Not at all (0) [PHQ-2 Negative - No further assessment needed] : PHQ-2 Negative - No further assessment needed [With Significant Other] : lives with significant other [Retired] : retired [] :  [Significant Other] : lives with significant other [Feels Safe at Home] : Feels safe at home [Fully functional (bathing, dressing, toileting, transferring, walking, feeding)] : Fully functional (bathing, dressing, toileting, transferring, walking, feeding) [Fully functional (using the telephone, shopping, preparing meals, housekeeping, doing laundry, using] : Fully functional and needs no help or supervision to perform IADLs (using the telephone, shopping, preparing meals, housekeeping, doing laundry, using transportation, managing medications and managing finances) [Smoke Detector] : smoke detector [Carbon Monoxide Detector] : carbon monoxide detector [Seat Belt] :  uses seat belt [Former] : Former [20 or more] : 20 or more [> 15 Years] : > 15 Years [de-identified] : limited  [de-identified] : tries to keep to a low carb diet [Change in mental status noted] : No change in mental status noted [Reports changes in hearing] : Reports no changes in hearing [Reports changes in vision] : Reports no changes in vision [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

## 2024-04-29 NOTE — PHYSICAL EXAM
[No Acute Distress] : no acute distress [Well Nourished] : well nourished [Well Developed] : well developed [Well-Appearing] : well-appearing [Normal Sclera/Conjunctiva] : normal sclera/conjunctiva [PERRL] : pupils equal round and reactive to light [EOMI] : extraocular movements intact [Normal Outer Ear/Nose] : the outer ears and nose were normal in appearance [Normal Oropharynx] : the oropharynx was normal [Normal TMs] : both tympanic membranes were normal [Normal Nasal Mucosa] : the nasal mucosa was normal [No Lymphadenopathy] : no lymphadenopathy [Supple] : supple [No Respiratory Distress] : no respiratory distress  [No Accessory Muscle Use] : no accessory muscle use [Clear to Auscultation] : lungs were clear to auscultation bilaterally [Normal Rate] : normal rate  [Regular Rhythm] : with a regular rhythm [Normal S1, S2] : normal S1 and S2 [No Edema] : there was no peripheral edema [Soft] : abdomen soft [Non Tender] : non-tender [Non-distended] : non-distended [Normal Bowel Sounds] : normal bowel sounds [Normal Posterior Cervical Nodes] : no posterior cervical lymphadenopathy [Normal Anterior Cervical Nodes] : no anterior cervical lymphadenopathy [No CVA Tenderness] : no CVA  tenderness [No Spinal Tenderness] : no spinal tenderness [No Joint Swelling] : no joint swelling [Grossly Normal Strength/Tone] : grossly normal strength/tone [Coordination Grossly Intact] : coordination grossly intact [Normal Gait] : normal gait [Normal Affect] : the affect was normal [Normal Insight/Judgement] : insight and judgment were intact

## 2024-04-29 NOTE — HISTORY OF PRESENT ILLNESS
[FreeTextEntry1] : NAVA [de-identified] : Patient presents today for an AWV. Last office visit was 7/6/23 and the patient has not followed up since. States he has discontinued all his medications other than metformin, statin, and ASA   Vaccinations: defers Flu, covid19 x2, Tdap - due, Shingrix - due, PCV -- defers  Last Colonoscopy: states it was about 10 years ago; -- does not follow with GI  UTD with routine dental and eye exams  Follows with endocrinology for hx of diabetes and subclinical hypothyroidism

## 2024-04-29 NOTE — REVIEW OF SYSTEMS
[Fatigue] : fatigue [Fever] : no fever [Chills] : no chills [Pain] : no pain [Vision Problems] : no vision problems [Nasal Discharge] : no nasal discharge [Sore Throat] : no sore throat [Chest Pain] : no chest pain [Palpitations] : no palpitations [Lower Ext Edema] : no lower extremity edema [Shortness Of Breath] : no shortness of breath [Wheezing] : no wheezing [Cough] : no cough [Dyspnea on Exertion] : not dyspnea on exertion [Diarrhea] : no diarrhea [Vomiting] : no vomiting [Dysuria] : no dysuria [Hematuria] : no hematuria [Muscle Weakness] : no muscle weakness [Itching] : no itching [Skin Rash] : no skin rash [Headache] : no headache [Dizziness] : no dizziness [Insomnia] : no insomnia [Depression] : no depression [Easy Bleeding] : no easy bleeding [Easy Bruising] : no easy bruising [FreeTextEntry9] : Generalized muscle aches

## 2024-04-29 NOTE — PLAN
[FreeTextEntry1] : 1. CPE - Routine labs ordered (CBC, CMP, Lipid panel, TSH, A1c) - Defers influenza vaccination and PCV - Shingles vaccination recommended -- pt to get at pharmacy, Due for Tdap -- to get at pharmacy  - GI referral provided for colon cancer screening - EKG -- SR - Depression screen with PHQ2 negative - UTD with dental and eye exams  2. Hx of Wheezing - no currently wheezing on exam - Pt is a former smoker and quit 17 years ago - admits to long standing hx (5-6years) of what feels like shallow breathing and intermittent wheezing -- states wheezing has now resolved - does not use albuterol inhaler - States he was evaluated by a pulmonologist about a year ago and was told his "lungs were working at 50%" - Referral to pulmonology for PFT's provided previously -- pt did not establish care -- referral again provided  3. DM - A1c 7.2%; States FBG has been in the 130's-170's - Repeat A1c ordered - on metformin 500mg daily.  - He states he stopped farxiga 5mg due to increased frequency and states he was started on another medication by his endocrinologist about 3 months ago, but is not taking that and he states his endocrinologist is aware - low carb and low sugar diet and regular exercise again encouraged and emphasized - on statin, but he self discontinued vascepa -- fasting lipid panel ordered - He was started on lisinopril at previous visit, but has not started it -- states he will speak to his endocrinologist regarding it first   4. TIA - Hx of likely TIA in 3/2023 which presented with slurred speech -- was admitted to [facility] and evaluated by cardio and neurology inpatient - He had a TTE which showed normal EF. CT head negative for CVA. CTA head and neck were wnl. - on statin and ASA - States he saw a neurologist outpatient and reports no further follow up was needed   5. Hx of Vitamin B12 Deficiency - off vitamin B12 supplements  - serum vitamin B12 level ordered  6. Fatigue and Generalized Body Aches - chronic, x6 years - previous work up was significant for low vitamin B12 and Vitamin D level and mildly elevated CPR and ESR -- he was previously referred to rheumatology, but has not established care with rheumatology -- referral again provided  - he is currently off vitamin D and B12 supplementation  7, Subclinical Hypothyroidism - states his endocrinologist is aware and is monitoring him off medication - repeat TSH ordered   8. Hx of Nasal Polyps - Referral for ENT provided previously - did not establish care -- referral again provided   3 month f/u recommended ; importance of routine follow up and medication compliance discussed

## 2024-04-30 DIAGNOSIS — R71.8 OTHER ABNORMALITY OF RED BLOOD CELLS: ICD-10-CM

## 2024-04-30 LAB
25(OH)D3 SERPL-MCNC: 34.3 NG/ML
ALBUMIN SERPL ELPH-MCNC: 4.7 G/DL
ALP BLD-CCNC: 100 U/L
ALT SERPL-CCNC: 16 U/L
ANION GAP SERPL CALC-SCNC: 13 MMOL/L
AST SERPL-CCNC: 20 U/L
BILIRUB SERPL-MCNC: 0.5 MG/DL
BUN SERPL-MCNC: 14 MG/DL
CALCIUM SERPL-MCNC: 9.9 MG/DL
CHLORIDE SERPL-SCNC: 99 MMOL/L
CHOLEST SERPL-MCNC: 125 MG/DL
CO2 SERPL-SCNC: 27 MMOL/L
CREAT SERPL-MCNC: 0.71 MG/DL
EGFR: 98 ML/MIN/1.73M2
ESTIMATED AVERAGE GLUCOSE: 194 MG/DL
FOLATE SERPL-MCNC: 11.4 NG/ML
GLUCOSE SERPL-MCNC: 165 MG/DL
HBA1C MFR BLD HPLC: 8.4 %
HCT VFR BLD CALC: 44.7 %
HDLC SERPL-MCNC: 31 MG/DL
HGB BLD-MCNC: 13.6 G/DL
LDLC SERPL CALC-MCNC: 41 MG/DL
MCHC RBC-ENTMCNC: 19.9 PG
MCHC RBC-ENTMCNC: 30.4 GM/DL
MCV RBC AUTO: 65.4 FL
NONHDLC SERPL-MCNC: 93 MG/DL
PLATELET # BLD AUTO: NORMAL K/UL
POTASSIUM SERPL-SCNC: 4.3 MMOL/L
PROT SERPL-MCNC: 7.7 G/DL
PSA SERPL-MCNC: 1.46 NG/ML
RBC # BLD: 6.84 M/UL
RBC # FLD: 19.9 %
SODIUM SERPL-SCNC: 139 MMOL/L
TRIGL SERPL-MCNC: 352 MG/DL
TSH SERPL-ACNC: 6.09 UIU/ML
VIT B12 SERPL-MCNC: 202 PG/ML
WBC # FLD AUTO: 7.49 K/UL

## 2024-04-30 RX ORDER — ICOSAPENT ETHYL 1 G/1
1 CAPSULE ORAL TWICE DAILY
Qty: 360 | Refills: 0 | Status: ACTIVE | COMMUNITY
Start: 1900-01-01 | End: 1900-01-01

## 2024-05-10 RX ORDER — ROSUVASTATIN CALCIUM 20 MG/1
20 TABLET, FILM COATED ORAL DAILY
Qty: 90 | Refills: 0 | Status: ACTIVE | COMMUNITY
Start: 1900-01-01 | End: 1900-01-01

## 2024-06-11 RX ORDER — LANCETS 30 GAUGE
EACH MISCELLANEOUS
Qty: 1 | Refills: 3 | Status: ACTIVE | COMMUNITY
Start: 2024-06-11 | End: 1900-01-01

## 2024-06-11 RX ORDER — BLOOD SUGAR DIAGNOSTIC
STRIP MISCELLANEOUS
Qty: 270 | Refills: 3 | Status: ACTIVE | COMMUNITY
Start: 2024-06-11 | End: 1900-01-01

## 2024-06-12 ENCOUNTER — APPOINTMENT (OUTPATIENT)
Dept: PULMONOLOGY | Facility: CLINIC | Age: 74
End: 2024-06-12

## 2024-06-12 NOTE — ASSESSMENT
[FreeTextEntry1] : ~age~yo man and former <smoker> referred by PMD for further evaluation of intermittent wheezing.  Data Reviewed: PMD charts (Dr. Hay)  Impression:  Plan:

## 2024-06-13 RX ORDER — BLOOD-GLUCOSE METER
W/DEVICE KIT MISCELLANEOUS
Qty: 1 | Refills: 0 | Status: ACTIVE | COMMUNITY
Start: 2024-06-13 | End: 1900-01-01

## 2024-07-29 ENCOUNTER — APPOINTMENT (OUTPATIENT)
Dept: FAMILY MEDICINE | Facility: CLINIC | Age: 74
End: 2024-07-29

## 2024-08-12 ENCOUNTER — RX RENEWAL (OUTPATIENT)
Age: 74
End: 2024-08-12

## 2024-11-26 ENCOUNTER — APPOINTMENT (OUTPATIENT)
Dept: FAMILY MEDICINE | Facility: CLINIC | Age: 74
End: 2024-11-26
Payer: MEDICARE

## 2024-11-26 VITALS
HEIGHT: 67 IN | HEART RATE: 78 BPM | OXYGEN SATURATION: 98 % | DIASTOLIC BLOOD PRESSURE: 77 MMHG | BODY MASS INDEX: 30.13 KG/M2 | WEIGHT: 192 LBS | SYSTOLIC BLOOD PRESSURE: 135 MMHG | TEMPERATURE: 97.7 F

## 2024-11-26 DIAGNOSIS — Z12.11 ENCOUNTER FOR SCREENING FOR MALIGNANT NEOPLASM OF COLON: ICD-10-CM

## 2024-11-26 DIAGNOSIS — R06.2 WHEEZING: ICD-10-CM

## 2024-11-26 DIAGNOSIS — R71.8 OTHER ABNORMALITY OF RED BLOOD CELLS: ICD-10-CM

## 2024-11-26 DIAGNOSIS — R79.89 OTHER SPECIFIED ABNORMAL FINDINGS OF BLOOD CHEMISTRY: ICD-10-CM

## 2024-11-26 DIAGNOSIS — E78.5 HYPERLIPIDEMIA, UNSPECIFIED: ICD-10-CM

## 2024-11-26 DIAGNOSIS — E11.9 TYPE 2 DIABETES MELLITUS W/OUT COMPLICATIONS: ICD-10-CM

## 2024-11-26 DIAGNOSIS — E03.8 OTHER SPECIFIED HYPOTHYROIDISM: ICD-10-CM

## 2024-11-26 DIAGNOSIS — E53.8 DEFICIENCY OF OTHER SPECIFIED B GROUP VITAMINS: ICD-10-CM

## 2024-11-26 DIAGNOSIS — R53.83 OTHER FATIGUE: ICD-10-CM

## 2024-11-26 DIAGNOSIS — Z87.09 PERSONAL HISTORY OF OTHER DISEASES OF THE RESPIRATORY SYSTEM: ICD-10-CM

## 2024-11-26 LAB — HBA1C MFR BLD HPLC: 8.4

## 2024-11-26 PROCEDURE — G2211 COMPLEX E/M VISIT ADD ON: CPT

## 2024-11-26 PROCEDURE — 99214 OFFICE O/P EST MOD 30 MIN: CPT

## 2024-11-26 PROCEDURE — 83036 HEMOGLOBIN GLYCOSYLATED A1C: CPT | Mod: QW

## 2024-11-26 RX ORDER — PIOGLITAZONE HYDROCHLORIDE AND METFORMIN HYDROCHLORIDE 15; 850 MG/1; MG/1
15-850 TABLET, FILM COATED ORAL
Refills: 0 | Status: ACTIVE | COMMUNITY

## 2024-11-26 RX ORDER — SEMAGLUTIDE 0.68 MG/ML
2 INJECTION, SOLUTION SUBCUTANEOUS
Refills: 0 | Status: ACTIVE | COMMUNITY

## 2024-11-27 ENCOUNTER — LABORATORY RESULT (OUTPATIENT)
Age: 74
End: 2024-11-27

## 2024-12-03 ENCOUNTER — NON-APPOINTMENT (OUTPATIENT)
Age: 74
End: 2024-12-03

## 2024-12-03 LAB
ALBUMIN SERPL ELPH-MCNC: 4.5 G/DL
ALP BLD-CCNC: 112 U/L
ALT SERPL-CCNC: 14 U/L
ANION GAP SERPL CALC-SCNC: 12 MMOL/L
AST SERPL-CCNC: 12 U/L
BASOPHILS # BLD AUTO: 0.03 K/UL
BASOPHILS NFR BLD AUTO: 0.3 %
BILIRUB SERPL-MCNC: 0.7 MG/DL
BUN SERPL-MCNC: 12 MG/DL
CALCIUM SERPL-MCNC: 9.9 MG/DL
CHLORIDE SERPL-SCNC: 100 MMOL/L
CHOLEST SERPL-MCNC: 140 MG/DL
CO2 SERPL-SCNC: 28 MMOL/L
CREAT SERPL-MCNC: 0.8 MG/DL
EGFR: 95 ML/MIN/1.73M2
EOSINOPHIL # BLD AUTO: 0.33 K/UL
EOSINOPHIL NFR BLD AUTO: 3.5 %
GLUCOSE SERPL-MCNC: 129 MG/DL
HCT VFR BLD CALC: 45 %
HDLC SERPL-MCNC: 34 MG/DL
HGB A MFR BLD: 94 %
HGB A2 MFR BLD: 6 %
HGB BLD-MCNC: 13.6 G/DL
HGB FRACT BLD-IMP: NORMAL
IMM GRANULOCYTES NFR BLD AUTO: 0.3 %
LDLC SERPL CALC-MCNC: 68 MG/DL
LYMPHOCYTES # BLD AUTO: 2.59 K/UL
LYMPHOCYTES NFR BLD AUTO: 27.8 %
MAN DIFF?: NORMAL
MCHC RBC-ENTMCNC: 20.2 PG
MCHC RBC-ENTMCNC: 30.2 G/DL
MCV RBC AUTO: 67 FL
MONOCYTES # BLD AUTO: 0.74 K/UL
MONOCYTES NFR BLD AUTO: 8 %
NEUTROPHILS # BLD AUTO: 5.58 K/UL
NEUTROPHILS NFR BLD AUTO: 60.1 %
NONHDLC SERPL-MCNC: 106 MG/DL
PLATELET # BLD AUTO: 189 K/UL
POTASSIUM SERPL-SCNC: 4.3 MMOL/L
PROT SERPL-MCNC: 7.3 G/DL
RBC # BLD: 6.72 M/UL
RBC # FLD: 19.7 %
SODIUM SERPL-SCNC: 140 MMOL/L
TRIGL SERPL-MCNC: 230 MG/DL
TSH SERPL-ACNC: 5.7 UIU/ML
VIT B12 SERPL-MCNC: 199 PG/ML
WBC # FLD AUTO: 9.3 K/UL

## 2024-12-13 ENCOUNTER — RX RENEWAL (OUTPATIENT)
Age: 74
End: 2024-12-13

## 2024-12-29 ENCOUNTER — NON-APPOINTMENT (OUTPATIENT)
Age: 74
End: 2024-12-29

## 2025-01-30 RX ORDER — LANCETS 33 GAUGE
EACH MISCELLANEOUS
Qty: 1 | Refills: 3 | Status: ACTIVE | COMMUNITY
Start: 2025-01-30 | End: 1900-01-01

## 2025-02-18 ENCOUNTER — RX RENEWAL (OUTPATIENT)
Age: 75
End: 2025-02-18

## 2025-02-27 ENCOUNTER — APPOINTMENT (OUTPATIENT)
Dept: FAMILY MEDICINE | Facility: CLINIC | Age: 75
End: 2025-02-27

## 2025-08-13 ENCOUNTER — RX RENEWAL (OUTPATIENT)
Age: 75
End: 2025-08-13